# Patient Record
Sex: FEMALE | Race: WHITE | NOT HISPANIC OR LATINO | Employment: FULL TIME | URBAN - METROPOLITAN AREA
[De-identification: names, ages, dates, MRNs, and addresses within clinical notes are randomized per-mention and may not be internally consistent; named-entity substitution may affect disease eponyms.]

---

## 2023-03-27 DIAGNOSIS — I10 HYPERTENSION, UNSPECIFIED TYPE: ICD-10-CM

## 2023-03-27 DIAGNOSIS — E11.42 DIABETIC POLYNEUROPATHY ASSOCIATED WITH TYPE 2 DIABETES MELLITUS (MULTI): Primary | ICD-10-CM

## 2023-03-27 RX ORDER — GABAPENTIN 100 MG/1
1 CAPSULE ORAL 4 TIMES DAILY
COMMUNITY
Start: 2016-08-23 | End: 2023-03-27 | Stop reason: SDUPTHER

## 2023-03-27 RX ORDER — LOSARTAN POTASSIUM 100 MG/1
1 TABLET ORAL DAILY
COMMUNITY
Start: 2019-06-19 | End: 2023-03-27 | Stop reason: SDUPTHER

## 2023-03-27 RX ORDER — GABAPENTIN 100 MG/1
100 CAPSULE ORAL 4 TIMES DAILY
Qty: 90 CAPSULE | Refills: 1 | Status: SHIPPED | OUTPATIENT
Start: 2023-03-27 | End: 2023-03-29 | Stop reason: SDUPTHER

## 2023-03-27 RX ORDER — LOSARTAN POTASSIUM 100 MG/1
100 TABLET ORAL DAILY
Qty: 90 TABLET | Refills: 1 | Status: SHIPPED | OUTPATIENT
Start: 2023-03-27 | End: 2023-03-29 | Stop reason: SDUPTHER

## 2023-03-29 DIAGNOSIS — E11.42 DIABETIC POLYNEUROPATHY ASSOCIATED WITH TYPE 2 DIABETES MELLITUS (MULTI): ICD-10-CM

## 2023-03-29 DIAGNOSIS — I10 HYPERTENSION, UNSPECIFIED TYPE: ICD-10-CM

## 2023-04-02 RX ORDER — LOSARTAN POTASSIUM 100 MG/1
100 TABLET ORAL DAILY
Qty: 90 TABLET | Refills: 1 | Status: SHIPPED | OUTPATIENT
Start: 2023-04-02 | End: 2023-05-03 | Stop reason: SDUPTHER

## 2023-04-02 RX ORDER — GABAPENTIN 100 MG/1
100 CAPSULE ORAL 4 TIMES DAILY
Qty: 90 CAPSULE | Refills: 1 | Status: SHIPPED | OUTPATIENT
Start: 2023-04-02 | End: 2023-05-03 | Stop reason: SDUPTHER

## 2023-04-10 DIAGNOSIS — E11.42 DIABETIC POLYNEUROPATHY ASSOCIATED WITH TYPE 2 DIABETES MELLITUS (MULTI): ICD-10-CM

## 2023-04-10 DIAGNOSIS — I10 HYPERTENSION, UNSPECIFIED TYPE: ICD-10-CM

## 2023-04-10 RX ORDER — OMEPRAZOLE 40 MG/1
1 CAPSULE, DELAYED RELEASE ORAL DAILY
COMMUNITY
Start: 2021-07-30 | End: 2023-05-03 | Stop reason: SDUPTHER

## 2023-04-10 RX ORDER — SEMAGLUTIDE 1.34 MG/ML
INJECTION, SOLUTION SUBCUTANEOUS
COMMUNITY
End: 2023-05-03 | Stop reason: ALTCHOICE

## 2023-04-11 RX ORDER — SEMAGLUTIDE 1.34 MG/ML
INJECTION, SOLUTION SUBCUTANEOUS
Qty: 1 EACH | Refills: 11 | OUTPATIENT
Start: 2023-04-11

## 2023-04-11 RX ORDER — LOSARTAN POTASSIUM 100 MG/1
100 TABLET ORAL DAILY
Qty: 90 TABLET | Refills: 1 | OUTPATIENT
Start: 2023-04-11

## 2023-04-11 RX ORDER — GABAPENTIN 100 MG/1
100 CAPSULE ORAL 4 TIMES DAILY
Qty: 90 CAPSULE | Refills: 1 | OUTPATIENT
Start: 2023-04-11

## 2023-04-26 ENCOUNTER — TELEPHONE (OUTPATIENT)
Dept: PRIMARY CARE | Facility: CLINIC | Age: 48
End: 2023-04-26
Payer: COMMERCIAL

## 2023-04-26 DIAGNOSIS — E11.9 TYPE 2 DIABETES MELLITUS WITHOUT COMPLICATION, WITHOUT LONG-TERM CURRENT USE OF INSULIN (MULTI): Primary | ICD-10-CM

## 2023-04-26 DIAGNOSIS — I50.1 PULMONARY EDEMA CARDIAC CAUSE (MULTI): ICD-10-CM

## 2023-04-26 DIAGNOSIS — D69.6 THROMBOCYTOPENIA (CMS-HCC): ICD-10-CM

## 2023-04-27 ENCOUNTER — LAB (OUTPATIENT)
Dept: LAB | Facility: LAB | Age: 48
End: 2023-04-27
Payer: COMMERCIAL

## 2023-04-27 DIAGNOSIS — D69.6 THROMBOCYTOPENIA (CMS-HCC): ICD-10-CM

## 2023-04-27 DIAGNOSIS — E11.9 TYPE 2 DIABETES MELLITUS WITHOUT COMPLICATION, WITHOUT LONG-TERM CURRENT USE OF INSULIN (MULTI): ICD-10-CM

## 2023-04-27 DIAGNOSIS — I50.1 PULMONARY EDEMA CARDIAC CAUSE (MULTI): ICD-10-CM

## 2023-04-27 LAB
ALANINE AMINOTRANSFERASE (SGPT) (U/L) IN SER/PLAS: 31 U/L (ref 7–45)
ALBUMIN (G/DL) IN SER/PLAS: 4.1 G/DL (ref 3.4–5)
ALKALINE PHOSPHATASE (U/L) IN SER/PLAS: 71 U/L (ref 33–110)
ANION GAP IN SER/PLAS: 16 MMOL/L (ref 10–20)
ASPARTATE AMINOTRANSFERASE (SGOT) (U/L) IN SER/PLAS: 25 U/L (ref 9–39)
BILIRUBIN TOTAL (MG/DL) IN SER/PLAS: 0.3 MG/DL (ref 0–1.2)
CALCIUM (MG/DL) IN SER/PLAS: 9.3 MG/DL (ref 8.6–10.3)
CARBON DIOXIDE, TOTAL (MMOL/L) IN SER/PLAS: 20 MMOL/L (ref 21–32)
CHLORIDE (MMOL/L) IN SER/PLAS: 102 MMOL/L (ref 98–107)
CHOLESTEROL (MG/DL) IN SER/PLAS: 162 MG/DL (ref 0–199)
CHOLESTEROL IN HDL (MG/DL) IN SER/PLAS: 44.5 MG/DL
CHOLESTEROL/HDL RATIO: 3.6
COBALAMIN (VITAMIN B12) (PG/ML) IN SER/PLAS: 442 PG/ML (ref 211–911)
CREATININE (MG/DL) IN SER/PLAS: 0.76 MG/DL (ref 0.5–1.05)
ERYTHROCYTE DISTRIBUTION WIDTH (RATIO) BY AUTOMATED COUNT: 14 % (ref 11.5–14.5)
ERYTHROCYTE MEAN CORPUSCULAR HEMOGLOBIN CONCENTRATION (G/DL) BY AUTOMATED: 31.3 G/DL (ref 32–36)
ERYTHROCYTE MEAN CORPUSCULAR VOLUME (FL) BY AUTOMATED COUNT: 85 FL (ref 80–100)
ERYTHROCYTES (10*6/UL) IN BLOOD BY AUTOMATED COUNT: 4.9 X10E12/L (ref 4–5.2)
ESTIMATED AVERAGE GLUCOSE FOR HBA1C: 131 MG/DL
GFR FEMALE: >90 ML/MIN/1.73M2
GLUCOSE (MG/DL) IN SER/PLAS: 177 MG/DL (ref 74–99)
HEMATOCRIT (%) IN BLOOD BY AUTOMATED COUNT: 41.8 % (ref 36–46)
HEMOGLOBIN (G/DL) IN BLOOD: 13.1 G/DL (ref 12–16)
HEMOGLOBIN A1C/HEMOGLOBIN TOTAL IN BLOOD: 6.2 %
LDL: 60 MG/DL (ref 0–99)
LEUKOCYTES (10*3/UL) IN BLOOD BY AUTOMATED COUNT: 9.2 X10E9/L (ref 4.4–11.3)
NON HDL CHOLESTEROL: 118 MG/DL
PLATELETS (10*3/UL) IN BLOOD AUTOMATED COUNT: 273 X10E9/L (ref 150–450)
POTASSIUM (MMOL/L) IN SER/PLAS: 3.7 MMOL/L (ref 3.5–5.3)
PROTEIN TOTAL: 7.1 G/DL (ref 6.4–8.2)
SODIUM (MMOL/L) IN SER/PLAS: 134 MMOL/L (ref 136–145)
THYROTROPIN (MIU/L) IN SER/PLAS BY DETECTION LIMIT <= 0.05 MIU/L: 1.33 MIU/L (ref 0.44–3.98)
TRIGLYCERIDE (MG/DL) IN SER/PLAS: 287 MG/DL (ref 0–149)
UREA NITROGEN (MG/DL) IN SER/PLAS: 14 MG/DL (ref 6–23)
VLDL: 57 MG/DL (ref 0–40)

## 2023-04-27 PROCEDURE — 84443 ASSAY THYROID STIM HORMONE: CPT

## 2023-04-27 PROCEDURE — 83036 HEMOGLOBIN GLYCOSYLATED A1C: CPT

## 2023-04-27 PROCEDURE — 80053 COMPREHEN METABOLIC PANEL: CPT

## 2023-04-27 PROCEDURE — 82607 VITAMIN B-12: CPT

## 2023-04-27 PROCEDURE — 80061 LIPID PANEL: CPT

## 2023-04-27 PROCEDURE — 36415 COLL VENOUS BLD VENIPUNCTURE: CPT

## 2023-04-27 PROCEDURE — 85027 COMPLETE CBC AUTOMATED: CPT

## 2023-04-28 PROBLEM — R19.7 DIARRHEA: Status: ACTIVE | Noted: 2023-04-28

## 2023-04-28 PROBLEM — E11.40 DIABETIC NEUROPATHY (MULTI): Status: ACTIVE | Noted: 2023-04-28

## 2023-04-28 PROBLEM — J30.9 ALLERGIC RHINITIS: Status: ACTIVE | Noted: 2023-04-28

## 2023-04-28 PROBLEM — R60.9 DEPENDENT EDEMA: Status: ACTIVE | Noted: 2023-04-28

## 2023-04-28 PROBLEM — H90.3 ASYMMETRIC SNHL (SENSORINEURAL HEARING LOSS): Status: ACTIVE | Noted: 2023-04-28

## 2023-04-28 PROBLEM — H69.91 DYSFUNCTION OF RIGHT EUSTACHIAN TUBE: Status: ACTIVE | Noted: 2023-04-28

## 2023-04-28 PROBLEM — L71.9 ACNE ROSACEA: Status: ACTIVE | Noted: 2023-04-28

## 2023-04-28 PROBLEM — H90.2 CONDUCTIVE HEARING LOSS: Status: ACTIVE | Noted: 2023-04-28

## 2023-04-28 PROBLEM — J01.90 ACUTE SINUSITIS: Status: ACTIVE | Noted: 2023-04-28

## 2023-04-28 RX ORDER — VERAPAMIL HYDROCHLORIDE 180 MG/1
1 TABLET, FILM COATED, EXTENDED RELEASE ORAL DAILY
COMMUNITY
Start: 2022-01-26 | End: 2023-05-03 | Stop reason: ALTCHOICE

## 2023-04-28 RX ORDER — FLUTICASONE PROPIONATE 50 MCG
SPRAY, SUSPENSION (ML) NASAL
COMMUNITY
Start: 2020-07-13 | End: 2023-05-03 | Stop reason: SDUPTHER

## 2023-04-28 RX ORDER — PROPRANOLOL HYDROCHLORIDE 80 MG/1
80 TABLET ORAL
COMMUNITY
End: 2023-05-03 | Stop reason: ALTCHOICE

## 2023-04-28 RX ORDER — QUETIAPINE FUMARATE 100 MG/1
150 TABLET, FILM COATED ORAL
COMMUNITY
End: 2023-05-03 | Stop reason: ALTCHOICE

## 2023-04-28 RX ORDER — BUSPIRONE HYDROCHLORIDE 10 MG/1
TABLET ORAL
COMMUNITY
End: 2023-05-03 | Stop reason: ALTCHOICE

## 2023-04-28 RX ORDER — TORSEMIDE 20 MG/1
40 TABLET ORAL DAILY
COMMUNITY
End: 2023-05-03 | Stop reason: ALTCHOICE

## 2023-04-28 RX ORDER — BUSPIRONE HYDROCHLORIDE 30 MG/1
1 TABLET ORAL 2 TIMES DAILY
COMMUNITY
Start: 2023-03-07 | End: 2023-05-03 | Stop reason: ALTCHOICE

## 2023-04-28 RX ORDER — LOSARTAN POTASSIUM AND HYDROCHLOROTHIAZIDE 12.5; 1 MG/1; MG/1
TABLET ORAL
COMMUNITY
End: 2023-05-03 | Stop reason: ALTCHOICE

## 2023-04-28 RX ORDER — LAMOTRIGINE 25 MG/1
2 TABLET ORAL DAILY
COMMUNITY
Start: 2022-09-06 | End: 2023-05-03 | Stop reason: ALTCHOICE

## 2023-04-28 RX ORDER — HYDROXYZINE HYDROCHLORIDE 50 MG/1
50 TABLET, FILM COATED ORAL 3 TIMES DAILY
COMMUNITY

## 2023-04-28 RX ORDER — METHYLPREDNISOLONE 4 MG/1
TABLET ORAL
COMMUNITY
Start: 2023-02-17 | End: 2023-07-25 | Stop reason: SDUPTHER

## 2023-04-28 RX ORDER — LANOLIN ALCOHOL/MO/W.PET/CERES
2 CREAM (GRAM) TOPICAL 2 TIMES DAILY
COMMUNITY
Start: 2022-12-22 | End: 2023-05-03 | Stop reason: SDUPTHER

## 2023-04-28 RX ORDER — CHOLESTYRAMINE LIGHT 4 G/5.7G
POWDER, FOR SUSPENSION ORAL
COMMUNITY
Start: 2023-04-04 | End: 2023-05-03 | Stop reason: ALTCHOICE

## 2023-04-28 RX ORDER — BUSPIRONE HYDROCHLORIDE 15 MG/1
15 TABLET ORAL 2 TIMES DAILY
COMMUNITY
End: 2023-10-09

## 2023-04-28 RX ORDER — METFORMIN HYDROCHLORIDE 500 MG/1
4 TABLET, EXTENDED RELEASE ORAL
COMMUNITY
Start: 2022-01-12 | End: 2023-05-03 | Stop reason: SDUPTHER

## 2023-04-28 RX ORDER — LURASIDONE HYDROCHLORIDE 40 MG/1
1 TABLET, FILM COATED ORAL DAILY
COMMUNITY
Start: 2022-01-03 | End: 2023-10-09

## 2023-04-28 RX ORDER — POTASSIUM CHLORIDE 600 MG/1
80 TABLET, FILM COATED, EXTENDED RELEASE ORAL
COMMUNITY
End: 2023-05-03 | Stop reason: ALTCHOICE

## 2023-04-28 RX ORDER — BUMETANIDE 2 MG/1
1 TABLET ORAL 2 TIMES DAILY
COMMUNITY
Start: 2023-01-26 | End: 2023-05-03 | Stop reason: SDUPTHER

## 2023-04-28 RX ORDER — METOCLOPRAMIDE 10 MG/1
TABLET ORAL
COMMUNITY
Start: 2023-02-22 | End: 2023-05-03 | Stop reason: ALTCHOICE

## 2023-04-28 RX ORDER — VERAPAMIL HYDROCHLORIDE 240 MG/1
1 TABLET, FILM COATED, EXTENDED RELEASE ORAL DAILY
COMMUNITY
Start: 2014-09-11 | End: 2023-05-03 | Stop reason: SDUPTHER

## 2023-04-28 RX ORDER — LORAZEPAM 0.5 MG/1
0.5 TABLET ORAL EVERY 12 HOURS PRN
COMMUNITY
End: 2023-05-03 | Stop reason: ALTCHOICE

## 2023-04-28 RX ORDER — LOSARTAN POTASSIUM 100 MG/1
1 TABLET ORAL DAILY
COMMUNITY
Start: 2022-03-10 | End: 2023-05-03 | Stop reason: ALTCHOICE

## 2023-04-28 RX ORDER — MONTELUKAST SODIUM 10 MG/1
1 TABLET ORAL DAILY
COMMUNITY
Start: 2022-01-15 | End: 2023-05-03 | Stop reason: SDUPTHER

## 2023-04-28 RX ORDER — LURASIDONE HYDROCHLORIDE 40 MG/1
40 TABLET, FILM COATED ORAL DAILY
COMMUNITY
End: 2023-05-03 | Stop reason: ALTCHOICE

## 2023-04-28 RX ORDER — METOLAZONE 2.5 MG/1
TABLET ORAL
COMMUNITY
End: 2023-05-03 | Stop reason: ALTCHOICE

## 2023-04-28 RX ORDER — LAMOTRIGINE 100 MG/1
1 TABLET ORAL DAILY
COMMUNITY
Start: 2023-03-07

## 2023-04-28 RX ORDER — VERAPAMIL HYDROCHLORIDE 360 MG/1
180 CAPSULE, DELAYED RELEASE PELLETS ORAL
COMMUNITY
End: 2023-05-03 | Stop reason: ALTCHOICE

## 2023-04-28 RX ORDER — LORATADINE 10 MG/1
1 TABLET ORAL DAILY
COMMUNITY
Start: 2022-01-16 | End: 2023-05-03 | Stop reason: SDUPTHER

## 2023-04-28 RX ORDER — LACTULOSE 10 G/15ML
SOLUTION ORAL; RECTAL
COMMUNITY
Start: 2023-02-25 | End: 2023-05-03 | Stop reason: SDUPTHER

## 2023-04-28 RX ORDER — AZELASTINE 1 MG/ML
SPRAY, METERED NASAL
COMMUNITY
Start: 2022-04-28 | End: 2023-05-03 | Stop reason: SDUPTHER

## 2023-04-28 RX ORDER — KETOROLAC TROMETHAMINE 10 MG/1
TABLET, FILM COATED ORAL
COMMUNITY
Start: 2023-02-22 | End: 2023-05-03 | Stop reason: ALTCHOICE

## 2023-04-28 RX ORDER — BLOOD-GLUCOSE METER
EACH MISCELLANEOUS
COMMUNITY
Start: 2020-05-05 | End: 2023-05-03 | Stop reason: SDUPTHER

## 2023-04-28 RX ORDER — CHOLESTYRAMINE 4 G/4.8G
POWDER, FOR SUSPENSION ORAL
COMMUNITY
Start: 2022-01-12 | End: 2023-05-03 | Stop reason: ALTCHOICE

## 2023-04-28 RX ORDER — TOPIRAMATE 50 MG/1
TABLET, FILM COATED ORAL
COMMUNITY
End: 2023-05-03 | Stop reason: SDUPTHER

## 2023-04-28 RX ORDER — ALBUTEROL SULFATE 90 UG/1
AEROSOL, METERED RESPIRATORY (INHALATION)
COMMUNITY
Start: 2022-03-10 | End: 2023-05-03 | Stop reason: SDUPTHER

## 2023-04-28 RX ORDER — LAMOTRIGINE 150 MG/1
150 TABLET ORAL
COMMUNITY
End: 2023-05-03 | Stop reason: ALTCHOICE

## 2023-04-28 RX ORDER — POTASSIUM CHLORIDE 20 MEQ/1
40 TABLET, EXTENDED RELEASE ORAL DAILY
COMMUNITY
End: 2023-05-03 | Stop reason: SDUPTHER

## 2023-04-28 RX ORDER — ATORVASTATIN CALCIUM 10 MG/1
1 TABLET, FILM COATED ORAL NIGHTLY
COMMUNITY
Start: 2017-10-27 | End: 2023-05-03 | Stop reason: SDUPTHER

## 2023-04-28 RX ORDER — TOPIRAMATE 25 MG/1
TABLET ORAL
COMMUNITY
Start: 2023-04-19 | End: 2023-05-03 | Stop reason: ALTCHOICE

## 2023-05-02 RX ORDER — SEMAGLUTIDE 1.34 MG/ML
INJECTION, SOLUTION SUBCUTANEOUS
Qty: 1 EACH | Refills: 3 | Status: CANCELLED | OUTPATIENT
Start: 2023-05-02

## 2023-05-02 RX ORDER — OMEPRAZOLE 40 MG/1
40 CAPSULE, DELAYED RELEASE ORAL DAILY
Qty: 90 CAPSULE | Refills: 1 | Status: CANCELLED | OUTPATIENT
Start: 2023-05-02

## 2023-05-03 ENCOUNTER — TELEMEDICINE (OUTPATIENT)
Dept: PRIMARY CARE | Facility: CLINIC | Age: 48
End: 2023-05-03
Payer: COMMERCIAL

## 2023-05-03 ENCOUNTER — APPOINTMENT (OUTPATIENT)
Dept: PRIMARY CARE | Facility: CLINIC | Age: 48
End: 2023-05-03
Payer: COMMERCIAL

## 2023-05-03 VITALS — BODY MASS INDEX: 53.66 KG/M2 | TEMPERATURE: 100.7 F | WEIGHT: 284 LBS

## 2023-05-03 DIAGNOSIS — E11.9 TYPE 2 DIABETES MELLITUS WITHOUT COMPLICATION, WITHOUT LONG-TERM CURRENT USE OF INSULIN (MULTI): ICD-10-CM

## 2023-05-03 DIAGNOSIS — I51.89 DIASTOLIC DYSFUNCTION: ICD-10-CM

## 2023-05-03 DIAGNOSIS — K59.00 CONSTIPATION, UNSPECIFIED CONSTIPATION TYPE: ICD-10-CM

## 2023-05-03 DIAGNOSIS — G47.33 OBSTRUCTIVE SLEEP APNEA: ICD-10-CM

## 2023-05-03 DIAGNOSIS — G43.909 MIGRAINE WITHOUT STATUS MIGRAINOSUS, NOT INTRACTABLE, UNSPECIFIED MIGRAINE TYPE: ICD-10-CM

## 2023-05-03 DIAGNOSIS — I67.1 INTRACRANIAL ANEURYSM (HHS-HCC): ICD-10-CM

## 2023-05-03 DIAGNOSIS — E11.42 DIABETIC POLYNEUROPATHY ASSOCIATED WITH TYPE 2 DIABETES MELLITUS (MULTI): ICD-10-CM

## 2023-05-03 DIAGNOSIS — D69.3 IDIOPATHIC THROMBOCYTOPENIC PURPURA (MULTI): ICD-10-CM

## 2023-05-03 DIAGNOSIS — I10 HYPERTENSION, UNSPECIFIED TYPE: ICD-10-CM

## 2023-05-03 DIAGNOSIS — J45.909 ASTHMA, UNSPECIFIED ASTHMA SEVERITY, UNSPECIFIED WHETHER COMPLICATED, UNSPECIFIED WHETHER PERSISTENT (HHS-HCC): ICD-10-CM

## 2023-05-03 DIAGNOSIS — K21.9 GASTROESOPHAGEAL REFLUX DISEASE, UNSPECIFIED WHETHER ESOPHAGITIS PRESENT: ICD-10-CM

## 2023-05-03 DIAGNOSIS — E78.5 HYPERLIPIDEMIA, UNSPECIFIED HYPERLIPIDEMIA TYPE: Primary | ICD-10-CM

## 2023-05-03 DIAGNOSIS — J30.89 ALLERGIC RHINITIS DUE TO OTHER ALLERGIC TRIGGER, UNSPECIFIED SEASONALITY: ICD-10-CM

## 2023-05-03 PROBLEM — R60.9 DEPENDENT EDEMA: Status: RESOLVED | Noted: 2023-04-28 | Resolved: 2023-05-03

## 2023-05-03 PROBLEM — J01.90 ACUTE SINUSITIS: Status: RESOLVED | Noted: 2023-04-28 | Resolved: 2023-05-03

## 2023-05-03 PROBLEM — H69.91 DYSFUNCTION OF RIGHT EUSTACHIAN TUBE: Status: RESOLVED | Noted: 2023-04-28 | Resolved: 2023-05-03

## 2023-05-03 PROBLEM — R19.7 DIARRHEA: Status: RESOLVED | Noted: 2023-04-28 | Resolved: 2023-05-03

## 2023-05-03 PROCEDURE — 99214 OFFICE O/P EST MOD 30 MIN: CPT | Performed by: PEDIATRICS

## 2023-05-03 RX ORDER — GABAPENTIN 100 MG/1
100 CAPSULE ORAL 4 TIMES DAILY
Qty: 120 CAPSULE | Refills: 5 | Status: SHIPPED | OUTPATIENT
Start: 2023-05-03 | End: 2023-11-15

## 2023-05-03 RX ORDER — DEXTROSE 4 G
TABLET,CHEWABLE ORAL
Qty: 1 EACH | Refills: 0 | Status: SHIPPED | OUTPATIENT
Start: 2023-05-03 | End: 2023-05-13

## 2023-05-03 RX ORDER — BLOOD-GLUCOSE METER
EACH MISCELLANEOUS
Qty: 100 STRIP | Refills: 1 | Status: SHIPPED | OUTPATIENT
Start: 2023-05-03 | End: 2024-01-18 | Stop reason: WASHOUT

## 2023-05-03 RX ORDER — OMEPRAZOLE 40 MG/1
40 CAPSULE, DELAYED RELEASE ORAL DAILY
Qty: 90 CAPSULE | Refills: 1 | Status: SHIPPED | OUTPATIENT
Start: 2023-05-03 | End: 2023-10-05

## 2023-05-03 RX ORDER — FLUTICASONE PROPIONATE 50 MCG
2 SPRAY, SUSPENSION (ML) NASAL DAILY
Qty: 16 G | Refills: 5 | Status: SHIPPED | OUTPATIENT
Start: 2023-05-03 | End: 2024-03-04

## 2023-05-03 RX ORDER — METFORMIN HYDROCHLORIDE 500 MG/1
2000 TABLET, EXTENDED RELEASE ORAL
Qty: 360 TABLET | Refills: 1 | Status: SHIPPED | OUTPATIENT
Start: 2023-05-03 | End: 2024-03-06

## 2023-05-03 RX ORDER — BUMETANIDE 2 MG/1
2 TABLET ORAL 2 TIMES DAILY
Qty: 180 TABLET | Refills: 1 | Status: SHIPPED | OUTPATIENT
Start: 2023-05-03 | End: 2023-09-12

## 2023-05-03 RX ORDER — VERAPAMIL HYDROCHLORIDE 240 MG/1
240 TABLET, FILM COATED, EXTENDED RELEASE ORAL DAILY
Qty: 90 TABLET | Refills: 1 | Status: SHIPPED | OUTPATIENT
Start: 2023-05-03 | End: 2023-11-17

## 2023-05-03 RX ORDER — LANOLIN ALCOHOL/MO/W.PET/CERES
2 CREAM (GRAM) TOPICAL 2 TIMES DAILY
Qty: 120 TABLET | Refills: 5 | Status: SHIPPED | OUTPATIENT
Start: 2023-05-03 | End: 2023-10-05

## 2023-05-03 RX ORDER — LOSARTAN POTASSIUM 100 MG/1
100 TABLET ORAL DAILY
Qty: 90 TABLET | Refills: 1 | Status: SHIPPED | OUTPATIENT
Start: 2023-05-03 | End: 2024-01-18 | Stop reason: SDUPTHER

## 2023-05-03 RX ORDER — LORATADINE 10 MG/1
10 TABLET ORAL DAILY
Qty: 90 TABLET | Refills: 3 | Status: SHIPPED | OUTPATIENT
Start: 2023-05-03 | End: 2024-04-02

## 2023-05-03 RX ORDER — MONTELUKAST SODIUM 10 MG/1
10 TABLET ORAL DAILY
Qty: 90 TABLET | Refills: 1 | Status: SHIPPED | OUTPATIENT
Start: 2023-05-03 | End: 2023-11-17

## 2023-05-03 RX ORDER — ATORVASTATIN CALCIUM 10 MG/1
10 TABLET, FILM COATED ORAL NIGHTLY
Qty: 90 TABLET | Refills: 1 | Status: SHIPPED | OUTPATIENT
Start: 2023-05-03 | End: 2023-11-15

## 2023-05-03 RX ORDER — ALBUTEROL SULFATE 90 UG/1
AEROSOL, METERED RESPIRATORY (INHALATION)
Qty: 18 G | Refills: 3 | Status: SHIPPED | OUTPATIENT
Start: 2023-05-03 | End: 2024-01-04

## 2023-05-03 RX ORDER — TOPIRAMATE 50 MG/1
TABLET, FILM COATED ORAL
Qty: 90 TABLET | Refills: 1 | Status: SHIPPED | OUTPATIENT
Start: 2023-05-03 | End: 2023-08-19

## 2023-05-03 RX ORDER — POTASSIUM CHLORIDE 20 MEQ/1
40 TABLET, EXTENDED RELEASE ORAL DAILY
Qty: 180 TABLET | Refills: 1 | Status: SHIPPED | OUTPATIENT
Start: 2023-05-03 | End: 2024-01-04

## 2023-05-03 RX ORDER — LACTULOSE 10 G/15ML
SOLUTION ORAL; RECTAL
Qty: 2000 ML | Refills: 11 | Status: SHIPPED | OUTPATIENT
Start: 2023-05-03

## 2023-05-03 RX ORDER — AZELASTINE 1 MG/ML
2 SPRAY, METERED NASAL 2 TIMES DAILY
Qty: 30 ML | Refills: 5 | Status: SHIPPED | OUTPATIENT
Start: 2023-05-03 | End: 2023-10-05

## 2023-05-03 NOTE — ASSESSMENT & PLAN NOTE
LDL 60; on Atorva 10; TG high but does eat egg yolks an does eat butter; has lazaro and sausage at times; advised against these. Also eats cheese

## 2023-05-03 NOTE — PROGRESS NOTES
Subjective   Patient ID: Leslee Rushing is a 48 y.o. female who presents for fever     HPI   Patient developed fever of 100.7 for 3 days as well as cough; Covid test was negative Saturday; she had aches in hips and legs initially but not now; she has sneezing, runny nose, eyes are red and itchy; has not bringing up phlegm. No pain over sinuses; Has been using Nyquil;  Quit smoking last month  Review of Systems    Objective   Temp 38.2 °C (100.7 °F)   Wt 129 kg (284 lb)   BMI 53.66 kg/m²     Physical Exam  Patient seen on video, coughing intermittently but in no distress.  Assessment/Plan   Problem List Items Addressed This Visit          Nervous    Diabetic neuropathy (CMS/[facility])     Neuropathy controlled on Gabapentin         Obstructive sleep apnea     Uses CPAP nightly            Circulatory    Hypertension     Does not check at home;          Intracranial aneurysm     Followed by Dr Ch            Endocrine/Metabolic    Diabetes mellitus (CMS/[facility])     Lost meter but A1C was excellent            Other    Allergic rhinitis     Controlled with claritin and flonase and azelastine         Hyperlipidemia - Primary     LDL 60; on Atorva 10; TG high but does eat egg yolks an does eat butter; has lazaro and sausage at times; advised against these. Also eats cheese         Idiopathic thrombocytopenic purpura (CMS/[facility])     Last platelets normal         Migraine headache     Topamax controls HA          Other Visit Diagnoses       Asthma, unspecified asthma severity, unspecified whether complicated, unspecified whether persistent        Diastolic dysfunction        Constipation, unspecified constipation type        Gastroesophageal reflux disease, unspecified whether esophagitis present

## 2023-05-04 DIAGNOSIS — E11.9 TYPE 2 DIABETES MELLITUS WITHOUT COMPLICATION, WITHOUT LONG-TERM CURRENT USE OF INSULIN (MULTI): ICD-10-CM

## 2023-05-09 RX ORDER — CHOLESTYRAMINE LIGHT 4 G/5.7G
POWDER, FOR SUSPENSION ORAL
Qty: 231 G | Refills: 10 | OUTPATIENT
Start: 2023-05-09

## 2023-05-09 RX ORDER — DEXTROSE 4 G
TABLET,CHEWABLE ORAL
Refills: 10 | OUTPATIENT
Start: 2023-05-09

## 2023-05-10 DIAGNOSIS — E11.9 TYPE 2 DIABETES MELLITUS WITHOUT COMPLICATION, WITHOUT LONG-TERM CURRENT USE OF INSULIN (MULTI): ICD-10-CM

## 2023-05-13 RX ORDER — CHOLESTYRAMINE LIGHT 4 G/5.7G
POWDER, FOR SUSPENSION ORAL
Qty: 231 G | Refills: 10 | OUTPATIENT
Start: 2023-05-13

## 2023-05-13 RX ORDER — DEXTROSE 4 G
TABLET,CHEWABLE ORAL
Qty: 1 EACH | Refills: 10 | Status: SHIPPED | OUTPATIENT
Start: 2023-05-13

## 2023-05-18 RX ORDER — CHOLESTYRAMINE LIGHT 4 G/5.7G
POWDER, FOR SUSPENSION ORAL
Qty: 231 G | Refills: 10 | OUTPATIENT
Start: 2023-05-18

## 2023-05-24 RX ORDER — CHOLESTYRAMINE LIGHT 4 G/5.7G
POWDER, FOR SUSPENSION ORAL
Qty: 231 G | Refills: 10 | OUTPATIENT
Start: 2023-05-24

## 2023-05-26 RX ORDER — CHOLESTYRAMINE LIGHT 4 G/5.7G
POWDER, FOR SUSPENSION ORAL
COMMUNITY
Start: 2023-05-04 | End: 2023-05-27 | Stop reason: ALTCHOICE

## 2023-05-27 DIAGNOSIS — J30.89 ALLERGIC RHINITIS DUE TO OTHER ALLERGIC TRIGGER, UNSPECIFIED SEASONALITY: ICD-10-CM

## 2023-05-27 RX ORDER — CHOLESTYRAMINE LIGHT 4 G/5.7G
POWDER, FOR SUSPENSION ORAL
Qty: 239.4 G | Refills: 1 | OUTPATIENT
Start: 2023-05-27

## 2023-06-02 RX ORDER — CLOBETASOL PROPIONATE 0.5 MG/G
OINTMENT TOPICAL
COMMUNITY
Start: 2023-05-18 | End: 2024-02-20 | Stop reason: WASHOUT

## 2023-06-02 RX ORDER — TORSEMIDE 20 MG/1
20 TABLET ORAL
COMMUNITY
End: 2024-01-18 | Stop reason: ALTCHOICE

## 2023-06-09 RX ORDER — CHOLESTYRAMINE LIGHT 4 G/5.7G
POWDER, FOR SUSPENSION ORAL
Qty: 231 G | Refills: 10 | OUTPATIENT
Start: 2023-06-09

## 2023-07-25 ENCOUNTER — OFFICE VISIT (OUTPATIENT)
Dept: PRIMARY CARE | Facility: CLINIC | Age: 48
End: 2023-07-25
Payer: COMMERCIAL

## 2023-07-25 VITALS
DIASTOLIC BLOOD PRESSURE: 66 MMHG | HEART RATE: 70 BPM | SYSTOLIC BLOOD PRESSURE: 115 MMHG | BODY MASS INDEX: 55.32 KG/M2 | WEIGHT: 293 LBS | TEMPERATURE: 98.7 F | HEIGHT: 61 IN | OXYGEN SATURATION: 98 %

## 2023-07-25 DIAGNOSIS — H10.13 ALLERGIC CONJUNCTIVITIS OF BOTH EYES: ICD-10-CM

## 2023-07-25 DIAGNOSIS — E11.9 TYPE 2 DIABETES MELLITUS WITHOUT COMPLICATION, WITHOUT LONG-TERM CURRENT USE OF INSULIN (MULTI): ICD-10-CM

## 2023-07-25 DIAGNOSIS — J30.89 ALLERGIC RHINITIS DUE TO OTHER ALLERGIC TRIGGER, UNSPECIFIED SEASONALITY: ICD-10-CM

## 2023-07-25 DIAGNOSIS — J45.20 MILD INTERMITTENT ASTHMA WITHOUT COMPLICATION (HHS-HCC): Primary | ICD-10-CM

## 2023-07-25 PROCEDURE — 1036F TOBACCO NON-USER: CPT | Performed by: PEDIATRICS

## 2023-07-25 PROCEDURE — 99213 OFFICE O/P EST LOW 20 MIN: CPT | Performed by: PEDIATRICS

## 2023-07-25 PROCEDURE — 3066F NEPHROPATHY DOC TX: CPT | Performed by: PEDIATRICS

## 2023-07-25 PROCEDURE — 4010F ACE/ARB THERAPY RXD/TAKEN: CPT | Performed by: PEDIATRICS

## 2023-07-25 PROCEDURE — 3044F HG A1C LEVEL LT 7.0%: CPT | Performed by: PEDIATRICS

## 2023-07-25 PROCEDURE — 3078F DIAST BP <80 MM HG: CPT | Performed by: PEDIATRICS

## 2023-07-25 PROCEDURE — 3074F SYST BP LT 130 MM HG: CPT | Performed by: PEDIATRICS

## 2023-07-25 RX ORDER — OLOPATADINE HYDROCHLORIDE 2 MG/ML
1 SOLUTION/ DROPS OPHTHALMIC DAILY
Qty: 15 ML | Refills: 2 | Status: SHIPPED | OUTPATIENT
Start: 2023-07-25 | End: 2023-07-25 | Stop reason: SDUPTHER

## 2023-07-25 RX ORDER — METHYLPREDNISOLONE 4 MG/1
TABLET ORAL
Qty: 21 TABLET | Refills: 0 | Status: SHIPPED | OUTPATIENT
Start: 2023-07-25 | End: 2023-07-31 | Stop reason: ALTCHOICE

## 2023-07-25 RX ORDER — ALBUTEROL SULFATE 0.83 MG/ML
2.5 SOLUTION RESPIRATORY (INHALATION) ONCE
Status: DISCONTINUED | OUTPATIENT
Start: 2023-07-25 | End: 2023-07-25

## 2023-07-25 RX ORDER — METHYLPREDNISOLONE 4 MG/1
TABLET ORAL
Qty: 21 TABLET | Refills: 0 | Status: SHIPPED | OUTPATIENT
Start: 2023-07-25 | End: 2023-07-25 | Stop reason: SDUPTHER

## 2023-07-25 RX ORDER — FLUTICASONE FUROATE, UMECLIDINIUM BROMIDE AND VILANTEROL TRIFENATATE 200; 62.5; 25 UG/1; UG/1; UG/1
1 POWDER RESPIRATORY (INHALATION) DAILY
Qty: 1 EACH | Refills: 0 | COMMUNITY
Start: 2023-07-25 | End: 2024-02-20 | Stop reason: SDUPTHER

## 2023-07-25 NOTE — PROGRESS NOTES
"Subjective   Patient ID: Leslee Rushing is a 48 y.o. female who presents for cough and allergy symptoms.    HPI   Patient states allergy symptoms worsened a few weeks ago; Taking Loratadine, Flonase, Azelastine and Montelukast;   Asthma is acting up as well; Coughing for 3 days as well as wheezing; using Albuterol for 3 days; has yellow and green mucus from the nose; Home Covid test negative. Eyes tearing and itchy  Sugars are running 118  Review of Systems    Objective   /66   Pulse 70   Temp 37.1 °C (98.7 °F)   Ht 1.549 m (5' 1\")   Wt 136 kg (300 lb)   SpO2 98%   BMI 56.68 kg/m²     Physical Exam  HEENT -->nares erythematous;   Lungs wheezes left base  Heart reg    Assessment/Plan   Problem List Items Addressed This Visit       Allergic rhinitis    Diabetes mellitus (CMS/HCC)    Mild intermittent asthma without complication - Primary     Asthma exacerbated due to cold and allergies         Relevant Medications    albuterol 2.5 mg /3 mL (0.083 %) nebulizer solution 2.5 mg (Start on 7/25/2023 12:30 PM)    methylPREDNISolone (Medrol Dospak) 4 mg tablets    Other Relevant Orders    Aerosol Treatment (21455)    Allergic conjunctivitis of both eyes    Relevant Medications    olopatadine (Pataday Once Daily Relief) 0.2 % ophthalmic solution          "
Pt states he just doesn't want it.

## 2023-07-31 RX ORDER — METHYLPREDNISOLONE 4 MG/1
TABLET ORAL
Qty: 21 TABLET | Refills: 0 | Status: SHIPPED | OUTPATIENT
Start: 2023-07-31 | End: 2023-08-01

## 2023-07-31 RX ORDER — OLOPATADINE HYDROCHLORIDE 2 MG/ML
1 SOLUTION/ DROPS OPHTHALMIC DAILY
Qty: 15 ML | Refills: 2 | Status: SHIPPED | OUTPATIENT
Start: 2023-07-31 | End: 2024-01-18 | Stop reason: WASHOUT

## 2023-08-17 DIAGNOSIS — G43.909 MIGRAINE WITHOUT STATUS MIGRAINOSUS, NOT INTRACTABLE, UNSPECIFIED MIGRAINE TYPE: ICD-10-CM

## 2023-08-19 RX ORDER — TOPIRAMATE 50 MG/1
TABLET, FILM COATED ORAL
Qty: 60 TABLET | Refills: 2 | Status: SHIPPED | OUTPATIENT
Start: 2023-08-19 | End: 2024-03-05

## 2023-09-07 PROBLEM — I67.1: Status: ACTIVE | Noted: 2023-09-07

## 2023-09-07 PROBLEM — E83.42 HYPOMAGNESEMIA: Status: ACTIVE | Noted: 2023-09-07

## 2023-09-07 PROBLEM — R94.31 PROLONGED QT INTERVAL: Status: ACTIVE | Noted: 2023-09-07

## 2023-09-07 PROBLEM — N93.8 DYSFUNCTIONAL UTERINE BLEEDING: Status: ACTIVE | Noted: 2023-09-07

## 2023-09-07 PROBLEM — R42 DIZZINESS AND GIDDINESS: Status: ACTIVE | Noted: 2023-09-07

## 2023-09-07 PROBLEM — F17.290 NICOTINE DEPENDENCE DUE TO VAPING TOBACCO PRODUCT: Status: ACTIVE | Noted: 2023-09-07

## 2023-09-07 PROBLEM — E87.6 HYPOKALEMIA: Status: ACTIVE | Noted: 2023-09-07

## 2023-09-07 PROBLEM — R06.02 SHORTNESS OF BREATH: Status: ACTIVE | Noted: 2023-09-07

## 2023-09-07 PROBLEM — D22.5 MELANOCYTIC NEVI OF TRUNK: Status: ACTIVE | Noted: 2018-10-16

## 2023-09-07 PROBLEM — N90.4 LICHEN SCLEROSUS OF FEMALE GENITALIA: Status: ACTIVE | Noted: 2023-09-07

## 2023-09-07 PROBLEM — J90 PLEURAL EFFUSION: Status: ACTIVE | Noted: 2023-09-07

## 2023-09-07 PROBLEM — L90.5 SCAR CONDITION AND FIBROSIS OF SKIN: Status: ACTIVE | Noted: 2018-10-16

## 2023-09-07 PROBLEM — H69.93 DYSFUNCTION OF BOTH EUSTACHIAN TUBES: Status: ACTIVE | Noted: 2023-09-07

## 2023-09-07 PROBLEM — R01.1 SYSTOLIC EJECTION MURMUR: Status: ACTIVE | Noted: 2023-09-07

## 2023-09-07 PROBLEM — R21 RASH: Status: ACTIVE | Noted: 2023-09-07

## 2023-09-07 PROBLEM — L91.8 OTHER HYPERTROPHIC DISORDERS OF THE SKIN: Status: ACTIVE | Noted: 2018-10-16

## 2023-09-07 PROBLEM — D22.39 MELANOCYTIC NEVI OF OTHER PARTS OF FACE: Status: ACTIVE | Noted: 2018-10-16

## 2023-09-07 PROBLEM — R06.00 DYSPNEA: Status: ACTIVE | Noted: 2023-09-07

## 2023-09-07 PROBLEM — L90.0 LICHEN SCLEROSUS: Status: ACTIVE | Noted: 2023-09-07

## 2023-09-07 PROBLEM — R35.0 URINARY FREQUENCY: Status: ACTIVE | Noted: 2023-09-07

## 2023-09-07 PROBLEM — R74.01 ELEVATED ALT MEASUREMENT: Status: ACTIVE | Noted: 2023-09-07

## 2023-09-07 PROBLEM — I87.2 CHRONIC VENOUS INSUFFICIENCY: Status: ACTIVE | Noted: 2018-10-16

## 2023-09-07 PROBLEM — L82.1 OTHER SEBORRHEIC KERATOSIS: Status: ACTIVE | Noted: 2018-10-16

## 2023-09-07 PROBLEM — N92.1 MENOMETRORRHAGIA: Status: ACTIVE | Noted: 2023-09-07

## 2023-09-07 PROBLEM — D18.01 HEMANGIOMA OF SKIN AND SUBCUTANEOUS TISSUE: Status: ACTIVE | Noted: 2018-10-16

## 2023-09-07 PROBLEM — B37.9 MONILIA INFECTION: Status: ACTIVE | Noted: 2023-09-07

## 2023-09-07 PROBLEM — N92.6 IRREGULAR MENSTRUAL CYCLE: Status: ACTIVE | Noted: 2023-09-07

## 2023-09-07 PROBLEM — H93.293 OTHER ABNORMAL AUDITORY PERCEPTIONS, BILATERAL: Status: ACTIVE | Noted: 2023-09-07

## 2023-09-07 PROBLEM — J81.1 PULMONARY EDEMA (HHS-HCC): Status: ACTIVE | Noted: 2023-09-07

## 2023-09-07 RX ORDER — NORTRIPTYLINE HYDROCHLORIDE 50 MG/1
50 CAPSULE ORAL NIGHTLY
COMMUNITY
End: 2024-01-18 | Stop reason: WASHOUT

## 2023-09-07 RX ORDER — METOCLOPRAMIDE 10 MG/1
10 TABLET ORAL AS NEEDED
COMMUNITY
Start: 2023-02-22 | End: 2024-01-18 | Stop reason: WASHOUT

## 2023-09-07 RX ORDER — CHOLESTYRAMINE 4 G/4.8G
4 POWDER, FOR SUSPENSION ORAL
COMMUNITY
Start: 2022-01-12 | End: 2024-01-18 | Stop reason: ALTCHOICE

## 2023-09-10 DIAGNOSIS — I51.89 DIASTOLIC DYSFUNCTION: ICD-10-CM

## 2023-09-12 RX ORDER — BUMETANIDE 2 MG/1
2 TABLET ORAL 2 TIMES DAILY
Qty: 60 TABLET | Refills: 2 | Status: SHIPPED | OUTPATIENT
Start: 2023-09-12 | End: 2024-01-04

## 2023-09-29 ENCOUNTER — OFFICE VISIT (OUTPATIENT)
Dept: PRIMARY CARE | Facility: CLINIC | Age: 48
End: 2023-09-29
Payer: COMMERCIAL

## 2023-09-29 VITALS
SYSTOLIC BLOOD PRESSURE: 104 MMHG | WEIGHT: 288.8 LBS | OXYGEN SATURATION: 95 % | TEMPERATURE: 96.9 F | HEIGHT: 61 IN | BODY MASS INDEX: 54.53 KG/M2 | HEART RATE: 75 BPM | DIASTOLIC BLOOD PRESSURE: 65 MMHG

## 2023-09-29 DIAGNOSIS — J01.00 ACUTE NON-RECURRENT MAXILLARY SINUSITIS: Primary | ICD-10-CM

## 2023-09-29 DIAGNOSIS — E11.42 TYPE 2 DIABETES MELLITUS WITH DIABETIC POLYNEUROPATHY, WITHOUT LONG-TERM CURRENT USE OF INSULIN (MULTI): Primary | ICD-10-CM

## 2023-09-29 DIAGNOSIS — J01.00 ACUTE NON-RECURRENT MAXILLARY SINUSITIS: ICD-10-CM

## 2023-09-29 DIAGNOSIS — K04.7 DENTAL INFECTION: ICD-10-CM

## 2023-09-29 DIAGNOSIS — E83.42 HYPOMAGNESEMIA: ICD-10-CM

## 2023-09-29 PROCEDURE — 4010F ACE/ARB THERAPY RXD/TAKEN: CPT | Performed by: PEDIATRICS

## 2023-09-29 PROCEDURE — 99213 OFFICE O/P EST LOW 20 MIN: CPT | Performed by: PEDIATRICS

## 2023-09-29 PROCEDURE — 3078F DIAST BP <80 MM HG: CPT | Performed by: PEDIATRICS

## 2023-09-29 PROCEDURE — 1036F TOBACCO NON-USER: CPT | Performed by: PEDIATRICS

## 2023-09-29 PROCEDURE — 3044F HG A1C LEVEL LT 7.0%: CPT | Performed by: PEDIATRICS

## 2023-09-29 PROCEDURE — 3066F NEPHROPATHY DOC TX: CPT | Performed by: PEDIATRICS

## 2023-09-29 PROCEDURE — 3074F SYST BP LT 130 MM HG: CPT | Performed by: PEDIATRICS

## 2023-09-29 RX ORDER — DOXYCYCLINE 100 MG/1
100 CAPSULE ORAL 2 TIMES DAILY
Qty: 20 CAPSULE | Refills: 0 | Status: SHIPPED | OUTPATIENT
Start: 2023-09-29 | End: 2023-09-29 | Stop reason: SDUPTHER

## 2023-09-29 RX ORDER — DOXYCYCLINE 100 MG/1
100 CAPSULE ORAL 2 TIMES DAILY
Qty: 20 CAPSULE | Refills: 0 | Status: SHIPPED | OUTPATIENT
Start: 2023-09-29 | End: 2023-09-30 | Stop reason: SDUPTHER

## 2023-09-29 RX ORDER — DOXYCYCLINE 100 MG/1
100 CAPSULE ORAL 2 TIMES DAILY
Qty: 20 CAPSULE | Refills: 0 | OUTPATIENT
Start: 2023-09-29

## 2023-09-29 ASSESSMENT — PAIN SCALES - GENERAL: PAINLEVEL: 4

## 2023-09-29 NOTE — PROGRESS NOTES
"Subjective   Patient ID: Leslee Rushing is a 48 y.o. female who presents for left facial pain and painful gum    HPI   Left temple to mandible is sore; left upper gum is sore and swollen; last saw dentist 6 months ago; no fevers at home; clear rhinorrhea which is chronic  Review of Systems    Objective   /65 (BP Location: Left arm, Patient Position: Sitting, BP Cuff Size: Adult)   Pulse 75   Temp 36.1 °C (96.9 °F) (Temporal)   Ht 1.549 m (5' 1\")   Wt 131 kg (288 lb 12.8 oz)   LMP 09/08/2023   SpO2 95%   BMI 54.57 kg/m²     Physical Exam  Tender left maxilla; slightly tender left gum  Assessment/Plan   Problem List Items Addressed This Visit             ICD-10-CM    Acute maxillary sinusitis - Primary J01.00    Dental infection K04.7          "

## 2023-09-30 RX ORDER — DOXYCYCLINE 100 MG/1
100 CAPSULE ORAL 2 TIMES DAILY
Qty: 20 CAPSULE | Refills: 0 | Status: SHIPPED | OUTPATIENT
Start: 2023-09-30 | End: 2023-10-10

## 2023-10-03 ENCOUNTER — TELEPHONE (OUTPATIENT)
Dept: PRIMARY CARE | Facility: CLINIC | Age: 48
End: 2023-10-03
Payer: COMMERCIAL

## 2023-10-03 DIAGNOSIS — I51.89 DIASTOLIC DYSFUNCTION: ICD-10-CM

## 2023-10-03 DIAGNOSIS — J30.89 ALLERGIC RHINITIS DUE TO OTHER ALLERGIC TRIGGER, UNSPECIFIED SEASONALITY: ICD-10-CM

## 2023-10-03 DIAGNOSIS — K21.9 GASTROESOPHAGEAL REFLUX DISEASE, UNSPECIFIED WHETHER ESOPHAGITIS PRESENT: ICD-10-CM

## 2023-10-03 NOTE — TELEPHONE ENCOUNTER
Dr. Sabillon [facility] Pharmacist left a voicemail message asking for you to call them regarding a RX that was received on 9/29  Doxy Hcl they need clarification call Pharmacist back at [phone].

## 2023-10-05 RX ORDER — LANOLIN ALCOHOL/MO/W.PET/CERES
2 CREAM (GRAM) TOPICAL 2 TIMES DAILY
Qty: 120 TABLET | Refills: 10 | Status: SHIPPED | OUTPATIENT
Start: 2023-10-05

## 2023-10-05 RX ORDER — OMEPRAZOLE 40 MG/1
40 CAPSULE, DELAYED RELEASE ORAL DAILY
Qty: 30 CAPSULE | Refills: 10 | Status: SHIPPED | OUTPATIENT
Start: 2023-10-05

## 2023-10-05 RX ORDER — AZELASTINE 1 MG/ML
2 SPRAY, METERED NASAL 2 TIMES DAILY
Qty: 30 ML | Refills: 10 | Status: SHIPPED | OUTPATIENT
Start: 2023-10-05

## 2023-11-07 DIAGNOSIS — E11.9 TYPE 2 DIABETES MELLITUS WITHOUT COMPLICATION, WITHOUT LONG-TERM CURRENT USE OF INSULIN (MULTI): ICD-10-CM

## 2023-11-08 RX ORDER — SEMAGLUTIDE 0.68 MG/ML
INJECTION, SOLUTION SUBCUTANEOUS
Qty: 3 ML | Refills: 0 | Status: SHIPPED | OUTPATIENT
Start: 2023-11-08 | End: 2023-12-06

## 2023-11-14 DIAGNOSIS — E78.5 HYPERLIPIDEMIA, UNSPECIFIED HYPERLIPIDEMIA TYPE: ICD-10-CM

## 2023-11-14 DIAGNOSIS — E11.42 DIABETIC POLYNEUROPATHY ASSOCIATED WITH TYPE 2 DIABETES MELLITUS (MULTI): ICD-10-CM

## 2023-11-15 RX ORDER — ATORVASTATIN CALCIUM 10 MG/1
10 TABLET, FILM COATED ORAL NIGHTLY
Qty: 30 TABLET | Refills: 10 | Status: SHIPPED | OUTPATIENT
Start: 2023-11-15 | End: 2023-12-06

## 2023-11-15 RX ORDER — GABAPENTIN 100 MG/1
100 CAPSULE ORAL 4 TIMES DAILY
Qty: 60 CAPSULE | Refills: 0 | Status: SHIPPED | OUTPATIENT
Start: 2023-11-15 | End: 2023-12-07

## 2023-11-17 DIAGNOSIS — I51.89 DIASTOLIC DYSFUNCTION: ICD-10-CM

## 2023-11-17 DIAGNOSIS — I10 HYPERTENSION, UNSPECIFIED TYPE: ICD-10-CM

## 2023-11-17 RX ORDER — MONTELUKAST SODIUM 10 MG/1
10 TABLET ORAL DAILY
Qty: 30 TABLET | Refills: 0 | Status: SHIPPED | OUTPATIENT
Start: 2023-11-17 | End: 2023-12-06

## 2023-11-17 RX ORDER — VERAPAMIL HYDROCHLORIDE 240 MG/1
240 TABLET, FILM COATED, EXTENDED RELEASE ORAL DAILY
Qty: 30 TABLET | Refills: 0 | Status: SHIPPED | OUTPATIENT
Start: 2023-11-17 | End: 2023-12-06

## 2023-12-04 DIAGNOSIS — E11.9 TYPE 2 DIABETES MELLITUS WITHOUT COMPLICATION, WITHOUT LONG-TERM CURRENT USE OF INSULIN (MULTI): ICD-10-CM

## 2023-12-04 DIAGNOSIS — I51.89 DIASTOLIC DYSFUNCTION: ICD-10-CM

## 2023-12-04 DIAGNOSIS — I10 HYPERTENSION, UNSPECIFIED TYPE: ICD-10-CM

## 2023-12-04 DIAGNOSIS — E78.5 HYPERLIPIDEMIA, UNSPECIFIED HYPERLIPIDEMIA TYPE: ICD-10-CM

## 2023-12-05 DIAGNOSIS — E11.42 DIABETIC POLYNEUROPATHY ASSOCIATED WITH TYPE 2 DIABETES MELLITUS (MULTI): ICD-10-CM

## 2023-12-06 RX ORDER — SEMAGLUTIDE 0.68 MG/ML
INJECTION, SOLUTION SUBCUTANEOUS
Qty: 3 ML | Refills: 0 | Status: SHIPPED | OUTPATIENT
Start: 2023-12-06 | End: 2024-01-04

## 2023-12-06 RX ORDER — MONTELUKAST SODIUM 10 MG/1
10 TABLET ORAL DAILY
Qty: 30 TABLET | Refills: 0 | Status: SHIPPED | OUTPATIENT
Start: 2023-12-06 | End: 2024-01-04

## 2023-12-06 RX ORDER — ATORVASTATIN CALCIUM 10 MG/1
10 TABLET, FILM COATED ORAL NIGHTLY
Qty: 30 TABLET | Refills: 0 | Status: SHIPPED | OUTPATIENT
Start: 2023-12-06

## 2023-12-06 RX ORDER — VERAPAMIL HYDROCHLORIDE 240 MG/1
240 TABLET, FILM COATED, EXTENDED RELEASE ORAL DAILY
Qty: 30 TABLET | Refills: 0 | Status: SHIPPED | OUTPATIENT
Start: 2023-12-06 | End: 2024-01-04

## 2023-12-07 RX ORDER — GABAPENTIN 100 MG/1
100 CAPSULE ORAL 4 TIMES DAILY
Qty: 60 CAPSULE | Refills: 1 | Status: SHIPPED | OUTPATIENT
Start: 2023-12-07 | End: 2024-01-17

## 2024-01-03 DIAGNOSIS — J45.909 ASTHMA, UNSPECIFIED ASTHMA SEVERITY, UNSPECIFIED WHETHER COMPLICATED, UNSPECIFIED WHETHER PERSISTENT (HHS-HCC): ICD-10-CM

## 2024-01-03 DIAGNOSIS — I10 HYPERTENSION, UNSPECIFIED TYPE: ICD-10-CM

## 2024-01-03 DIAGNOSIS — E11.9 TYPE 2 DIABETES MELLITUS WITHOUT COMPLICATION, WITHOUT LONG-TERM CURRENT USE OF INSULIN (MULTI): ICD-10-CM

## 2024-01-03 DIAGNOSIS — I51.89 DIASTOLIC DYSFUNCTION: ICD-10-CM

## 2024-01-04 RX ORDER — ALBUTEROL SULFATE 90 UG/1
AEROSOL, METERED RESPIRATORY (INHALATION)
Qty: 8.5 G | Refills: 0 | Status: SHIPPED | OUTPATIENT
Start: 2024-01-04 | End: 2024-02-03

## 2024-01-04 RX ORDER — POTASSIUM CHLORIDE 20 MEQ/1
40 TABLET, EXTENDED RELEASE ORAL DAILY
Qty: 60 TABLET | Refills: 0 | Status: SHIPPED | OUTPATIENT
Start: 2024-01-04 | End: 2024-02-03

## 2024-01-04 RX ORDER — MONTELUKAST SODIUM 10 MG/1
10 TABLET ORAL DAILY
Qty: 30 TABLET | Refills: 0 | Status: SHIPPED | OUTPATIENT
Start: 2024-01-04 | End: 2024-02-03

## 2024-01-04 RX ORDER — BUMETANIDE 2 MG/1
2 TABLET ORAL 2 TIMES DAILY
Qty: 60 TABLET | Refills: 0 | Status: SHIPPED | OUTPATIENT
Start: 2024-01-04 | End: 2024-02-03

## 2024-01-04 RX ORDER — VERAPAMIL HYDROCHLORIDE 240 MG/1
240 TABLET, FILM COATED, EXTENDED RELEASE ORAL DAILY
Qty: 30 TABLET | Refills: 10 | Status: SHIPPED | OUTPATIENT
Start: 2024-01-04

## 2024-01-04 RX ORDER — SEMAGLUTIDE 0.68 MG/ML
0.5 INJECTION, SOLUTION SUBCUTANEOUS
Qty: 3 ML | Refills: 0 | Status: SHIPPED | OUTPATIENT
Start: 2024-01-04 | End: 2024-01-18 | Stop reason: ALTCHOICE

## 2024-01-15 DIAGNOSIS — E11.42 DIABETIC POLYNEUROPATHY ASSOCIATED WITH TYPE 2 DIABETES MELLITUS (MULTI): ICD-10-CM

## 2024-01-17 DIAGNOSIS — E11.42 DIABETIC POLYNEUROPATHY ASSOCIATED WITH TYPE 2 DIABETES MELLITUS (MULTI): ICD-10-CM

## 2024-01-17 RX ORDER — GABAPENTIN 100 MG/1
100 CAPSULE ORAL 4 TIMES DAILY
Qty: 120 CAPSULE | Refills: 0 | Status: SHIPPED | OUTPATIENT
Start: 2024-01-17 | End: 2024-02-01

## 2024-01-18 ENCOUNTER — OFFICE VISIT (OUTPATIENT)
Dept: PRIMARY CARE | Facility: CLINIC | Age: 49
End: 2024-01-18
Payer: COMMERCIAL

## 2024-01-18 VITALS
SYSTOLIC BLOOD PRESSURE: 122 MMHG | TEMPERATURE: 97 F | DIASTOLIC BLOOD PRESSURE: 70 MMHG | WEIGHT: 285 LBS | BODY MASS INDEX: 53.85 KG/M2 | OXYGEN SATURATION: 93 % | HEART RATE: 76 BPM

## 2024-01-18 DIAGNOSIS — G43.909 MIGRAINE WITHOUT STATUS MIGRAINOSUS, NOT INTRACTABLE, UNSPECIFIED MIGRAINE TYPE: ICD-10-CM

## 2024-01-18 DIAGNOSIS — I35.0 AORTIC VALVE STENOSIS, ETIOLOGY OF CARDIAC VALVE DISEASE UNSPECIFIED: ICD-10-CM

## 2024-01-18 DIAGNOSIS — J45.20 MILD INTERMITTENT ASTHMA WITHOUT COMPLICATION (HHS-HCC): ICD-10-CM

## 2024-01-18 DIAGNOSIS — R74.01 ELEVATED ALT MEASUREMENT: ICD-10-CM

## 2024-01-18 DIAGNOSIS — I10 HYPERTENSION, UNSPECIFIED TYPE: ICD-10-CM

## 2024-01-18 DIAGNOSIS — I67.1 ANEURYSM, CAROTID ARTERY, INTERNAL (HHS-HCC): ICD-10-CM

## 2024-01-18 DIAGNOSIS — H90.3 ASYMMETRIC SNHL (SENSORINEURAL HEARING LOSS): ICD-10-CM

## 2024-01-18 DIAGNOSIS — D22.39 MELANOCYTIC NEVI OF OTHER PARTS OF FACE: ICD-10-CM

## 2024-01-18 DIAGNOSIS — E78.5 HYPERLIPIDEMIA, UNSPECIFIED HYPERLIPIDEMIA TYPE: ICD-10-CM

## 2024-01-18 DIAGNOSIS — Z12.11 COLON CANCER SCREENING: ICD-10-CM

## 2024-01-18 DIAGNOSIS — Z00.00 MEDICARE ANNUAL WELLNESS VISIT, SUBSEQUENT: Primary | ICD-10-CM

## 2024-01-18 DIAGNOSIS — E11.9 TYPE 2 DIABETES MELLITUS WITHOUT COMPLICATION, WITHOUT LONG-TERM CURRENT USE OF INSULIN (MULTI): ICD-10-CM

## 2024-01-18 DIAGNOSIS — N90.4 LICHEN SCLEROSUS OF FEMALE GENITALIA: ICD-10-CM

## 2024-01-18 DIAGNOSIS — E11.42 DIABETIC POLYNEUROPATHY ASSOCIATED WITH TYPE 2 DIABETES MELLITUS (MULTI): ICD-10-CM

## 2024-01-18 DIAGNOSIS — G47.33 OBSTRUCTIVE SLEEP APNEA: ICD-10-CM

## 2024-01-18 DIAGNOSIS — Z12.31 SCREENING MAMMOGRAM FOR BREAST CANCER: ICD-10-CM

## 2024-01-18 DIAGNOSIS — E87.6 HYPOKALEMIA: ICD-10-CM

## 2024-01-18 DIAGNOSIS — I1A.0 RESISTANT HYPERTENSION: ICD-10-CM

## 2024-01-18 DIAGNOSIS — J81.1 CHRONIC PULMONARY EDEMA (HHS-HCC): ICD-10-CM

## 2024-01-18 DIAGNOSIS — J30.89 ALLERGIC RHINITIS DUE TO OTHER ALLERGIC TRIGGER, UNSPECIFIED SEASONALITY: ICD-10-CM

## 2024-01-18 DIAGNOSIS — D69.3 IDIOPATHIC THROMBOCYTOPENIC PURPURA (MULTI): ICD-10-CM

## 2024-01-18 DIAGNOSIS — B37.9 MONILIA INFECTION: ICD-10-CM

## 2024-01-18 DIAGNOSIS — K21.9 GASTROESOPHAGEAL REFLUX DISEASE, UNSPECIFIED WHETHER ESOPHAGITIS PRESENT: ICD-10-CM

## 2024-01-18 DIAGNOSIS — F31.9 BIPOLAR AFFECTIVE DISORDER, REMISSION STATUS UNSPECIFIED (MULTI): ICD-10-CM

## 2024-01-18 DIAGNOSIS — E66.09 CLASS 2 OBESITY DUE TO EXCESS CALORIES WITH BODY MASS INDEX (BMI) OF 36.0 TO 36.9 IN ADULT, UNSPECIFIED WHETHER SERIOUS COMORBIDITY PRESENT: ICD-10-CM

## 2024-01-18 DIAGNOSIS — E83.42 HYPOMAGNESEMIA: ICD-10-CM

## 2024-01-18 DIAGNOSIS — E66.01 CLASS 3 SEVERE OBESITY DUE TO EXCESS CALORIES WITH SERIOUS COMORBIDITY AND BODY MASS INDEX (BMI) OF 50.0 TO 59.9 IN ADULT (MULTI): ICD-10-CM

## 2024-01-18 DIAGNOSIS — N92.6 IRREGULAR MENSTRUAL CYCLE: ICD-10-CM

## 2024-01-18 PROBLEM — N92.1 MENOMETRORRHAGIA: Status: RESOLVED | Noted: 2023-09-07 | Resolved: 2024-01-18

## 2024-01-18 PROBLEM — K04.7 DENTAL INFECTION: Status: RESOLVED | Noted: 2023-09-29 | Resolved: 2024-01-18

## 2024-01-18 PROBLEM — R35.0 URINARY FREQUENCY: Status: RESOLVED | Noted: 2023-09-07 | Resolved: 2024-01-18

## 2024-01-18 PROBLEM — H93.293 OTHER ABNORMAL AUDITORY PERCEPTIONS, BILATERAL: Status: RESOLVED | Noted: 2023-09-07 | Resolved: 2024-01-18

## 2024-01-18 PROBLEM — R42 DIZZINESS AND GIDDINESS: Status: RESOLVED | Noted: 2023-09-07 | Resolved: 2024-01-18

## 2024-01-18 PROBLEM — R06.02 SHORTNESS OF BREATH: Status: RESOLVED | Noted: 2023-09-07 | Resolved: 2024-01-18

## 2024-01-18 PROBLEM — N93.8 DYSFUNCTIONAL UTERINE BLEEDING: Status: RESOLVED | Noted: 2023-09-07 | Resolved: 2024-01-18

## 2024-01-18 PROBLEM — F17.290 NICOTINE DEPENDENCE DUE TO VAPING TOBACCO PRODUCT: Status: RESOLVED | Noted: 2023-09-07 | Resolved: 2024-01-18

## 2024-01-18 PROBLEM — J01.00 ACUTE MAXILLARY SINUSITIS: Status: RESOLVED | Noted: 2023-04-28 | Resolved: 2024-01-18

## 2024-01-18 PROBLEM — R06.00 DYSPNEA: Status: RESOLVED | Noted: 2023-09-07 | Resolved: 2024-01-18

## 2024-01-18 PROCEDURE — 3078F DIAST BP <80 MM HG: CPT | Performed by: PEDIATRICS

## 2024-01-18 PROCEDURE — 80053 COMPREHEN METABOLIC PANEL: CPT | Performed by: PEDIATRICS

## 2024-01-18 PROCEDURE — 82306 VITAMIN D 25 HYDROXY: CPT | Performed by: PEDIATRICS

## 2024-01-18 PROCEDURE — 3066F NEPHROPATHY DOC TX: CPT | Performed by: PEDIATRICS

## 2024-01-18 PROCEDURE — 3008F BODY MASS INDEX DOCD: CPT | Performed by: PEDIATRICS

## 2024-01-18 PROCEDURE — 83036 HEMOGLOBIN GLYCOSYLATED A1C: CPT | Performed by: PEDIATRICS

## 2024-01-18 PROCEDURE — 80074 ACUTE HEPATITIS PANEL: CPT

## 2024-01-18 PROCEDURE — 85025 COMPLETE CBC W/AUTO DIFF WBC: CPT | Performed by: PEDIATRICS

## 2024-01-18 PROCEDURE — 36415 COLL VENOUS BLD VENIPUNCTURE: CPT

## 2024-01-18 PROCEDURE — 1036F TOBACCO NON-USER: CPT | Performed by: PEDIATRICS

## 2024-01-18 PROCEDURE — G0439 PPPS, SUBSEQ VISIT: HCPCS | Performed by: PEDIATRICS

## 2024-01-18 PROCEDURE — G0008 ADMIN INFLUENZA VIRUS VAC: HCPCS | Performed by: PEDIATRICS

## 2024-01-18 PROCEDURE — 90686 IIV4 VACC NO PRSV 0.5 ML IM: CPT | Performed by: PEDIATRICS

## 2024-01-18 PROCEDURE — 99214 OFFICE O/P EST MOD 30 MIN: CPT | Performed by: PEDIATRICS

## 2024-01-18 PROCEDURE — 80061 LIPID PANEL: CPT | Performed by: PEDIATRICS

## 2024-01-18 PROCEDURE — 84443 ASSAY THYROID STIM HORMONE: CPT | Performed by: PEDIATRICS

## 2024-01-18 PROCEDURE — 3074F SYST BP LT 130 MM HG: CPT | Performed by: PEDIATRICS

## 2024-01-18 PROCEDURE — 83735 ASSAY OF MAGNESIUM: CPT

## 2024-01-18 PROCEDURE — 4010F ACE/ARB THERAPY RXD/TAKEN: CPT | Performed by: PEDIATRICS

## 2024-01-18 RX ORDER — NYSTATIN 100000 U/G
CREAM TOPICAL 2 TIMES DAILY
Qty: 30 G | Refills: 0 | Status: SHIPPED | OUTPATIENT
Start: 2024-01-18 | End: 2024-01-25

## 2024-01-18 RX ORDER — GABAPENTIN 100 MG/1
100 CAPSULE ORAL 4 TIMES DAILY
Qty: 120 CAPSULE | Refills: 0 | OUTPATIENT
Start: 2024-01-18

## 2024-01-18 RX ORDER — OMEPRAZOLE 40 MG/1
40 CAPSULE, DELAYED RELEASE ORAL DAILY
Qty: 30 CAPSULE | Refills: 10 | Status: CANCELLED | OUTPATIENT
Start: 2024-01-18

## 2024-01-18 RX ORDER — LOSARTAN POTASSIUM 100 MG/1
100 TABLET ORAL DAILY
Qty: 90 TABLET | Refills: 1 | Status: SHIPPED | OUTPATIENT
Start: 2024-01-18 | End: 2024-06-08

## 2024-01-18 ASSESSMENT — ACTIVITIES OF DAILY LIVING (ADL)
DRESSING: INDEPENDENT
BATHING: INDEPENDENT
GROCERY_SHOPPING: INDEPENDENT
MANAGING_FINANCES: INDEPENDENT
TAKING_MEDICATION: INDEPENDENT
DOING_HOUSEWORK: INDEPENDENT

## 2024-01-18 ASSESSMENT — PATIENT HEALTH QUESTIONNAIRE - PHQ9
2. FEELING DOWN, DEPRESSED OR HOPELESS: NOT AT ALL
1. LITTLE INTEREST OR PLEASURE IN DOING THINGS: NOT AT ALL
SUM OF ALL RESPONSES TO PHQ9 QUESTIONS 1 AND 2: 0

## 2024-01-18 NOTE — ASSESSMENT & PLAN NOTE
Take albuterol as needed, only when she gets cold-like symptoms  Took it once or twice in last three months

## 2024-01-18 NOTE — PROGRESS NOTES
Subjective   Patient ID: Leslee Rushing is a 48 y.o. female who presents for Medicare Wellness    HPI   Due to see gynecologist;  Exercising more  Mammogram done on 3/10/23 which was negative.   Infrequent periods since ablation was done  Review of Systems  No CP  Objective   /70   Pulse 76   Temp 36.1 °C (97 °F)   Wt 129 kg (285 lb)   SpO2 93%   BMI 53.85 kg/m²     Physical Exam  Vitals reviewed.   Constitutional:       General: She is not in acute distress.  HENT:      Head: Normocephalic.      Right Ear: Tympanic membrane normal.      Left Ear: Tympanic membrane normal.      Nose: Nose normal.      Mouth/Throat:      Pharynx: Oropharynx is clear.   Cardiovascular:      Rate and Rhythm: Normal rate and regular rhythm.      Heart sounds: Normal heart sounds.   Pulmonary:      Breath sounds: Normal breath sounds.   Abdominal:      Palpations: Abdomen is soft.      Tenderness: There is no abdominal tenderness.   Musculoskeletal:         General: No tenderness.   Skin:     Findings: No rash.      Comments: Sensation intact  Good pulses   Neurological:      General: No focal deficit present.      Mental Status: She is alert.   Psychiatric:         Mood and Affect: Mood normal.         Assessment/Plan   Problem List Items Addressed This Visit             ICD-10-CM    Allergic rhinitis J30.9     Continue Flonase, Singular, and Loratidine         Asymmetric SNHL (sensorineural hearing loss) H90.3     Audiologist Visit 7/27/23         Bipolar disorder (CMS/[facility]) F31.9     Continue Lamotrigine per psychiatrist at [facility]         Diabetes mellitus (CMS/[facility]) E11.9     A1c 6.2 (4/27/23)  Blood Sugars at home usually 102-103s  Continue Ozempic and Metformin             Relevant Medications    semaglutide (OZEMPIC) 1 mg/dose (4 mg/3 mL) pen injector    Other Relevant Orders    Hemoglobin A1C    Diabetic neuropathy (CMS/[facility]) E11.40     Controlled with Gabapentin; every once in a while may feel a shock in the  bottom of the feet         Hyperlipidemia E78.5     Continue Atorvastatin   Cholesterol 162, HDL 44, LDL 60 (4/27/23)         Relevant Orders    Lipid Panel    Thyroid Stimulating Hormone    Lipid Panel    Idiopathic thrombocytopenic purpura (CMS/[facility]) D69.3     Last platelet count 273 last year         Migraine headache G43.909     None since October on Topamax; peanuts trigger migraines         Hypertension I10     Continue  Verapamil and Bumex. Ran out of Losartan but has been advised to take it         Relevant Medications    losartan (Cozaar) 100 mg tablet    Class 3 severe obesity due to excess calories with serious comorbidity and body mass index (BMI) of 50.0 to 59.9 in adult (CMS/[facility]) E66.01, Z68.43    Relevant Orders    Comprehensive Metabolic Panel    Referral to Bariatric Surgery    Obstructive sleep apnea G47.33     Uses CPAP nightly         Relevant Orders    CBC    Vitamin D 25-Hydroxy,Total (for eval of Vitamin D levels)    Lipid Panel    Mild intermittent asthma without complication J45.20     Take albuterol as needed, only when she gets cold-like symptoms  Took it once or twice in last three months         Aneurysm, carotid artery, internal I67.1     Dr Parul Bruce, vascular specialist, is following her         Elevated ALT measurement R74.01     Last alt normal; never had liver ultrasound         Relevant Orders    US abdomen limited liver    Hepatitis panel, acute    Hypokalemia E87.6     Continue Potassium Supplement         Hypomagnesemia E83.42    Relevant Orders    Magnesium    Irregular menstrual cycle N92.6    Lichen sclerosus of female genitalia N90.4     On steroid cream per GYN (clobetasol)         Melanocytic nevi of other parts of face D22.39     Saw dermatologist         Josefina young B37.9     Has rash under abdominal folds         Relevant Medications    nystatin (Mycostatin) cream    Pulmonary edema J81.1     Not short of breath like she was since she has been on Bumex          Relevant Orders    XR chest 2 views    Aortic stenosis I35.0    Relevant Orders    Transthoracic Echo (TTE) Complete     Other Visit Diagnoses         Codes    Medicare annual wellness visit, subsequent    -  Primary Z00.00    Gastroesophageal reflux disease, unspecified whether esophagitis present     K21.9    Colon cancer screening     Z12.11    Relevant Orders    Cologuard® colon cancer screening    Class 2 obesity due to excess calories with body mass index (BMI) of 36.0 to 36.9 in adult, unspecified whether serious comorbidity present     E66.09, Z68.36    Relevant Orders    Vitamin D 25-Hydroxy,Total (for eval of Vitamin D levels)

## 2024-01-19 LAB
HAV IGM SER QL: NONREACTIVE
HBV CORE IGM SER QL: NONREACTIVE
HBV SURFACE AG SERPL QL IA: NONREACTIVE
HCV AB SER QL: NONREACTIVE
MAGNESIUM SERPL-MCNC: 1.96 MG/DL (ref 1.6–2.4)

## 2024-01-23 DIAGNOSIS — E55.9 VITAMIN D DEFICIENCY: Primary | ICD-10-CM

## 2024-01-23 RX ORDER — ERGOCALCIFEROL 1.25 MG/1
50000 CAPSULE ORAL
Qty: 12 CAPSULE | Refills: 0 | Status: SHIPPED | OUTPATIENT
Start: 2024-01-23 | End: 2024-04-16

## 2024-01-30 ENCOUNTER — TELEPHONE (OUTPATIENT)
Dept: SURGERY | Facility: CLINIC | Age: 49
End: 2024-01-30
Payer: COMMERCIAL

## 2024-01-30 NOTE — TELEPHONE ENCOUNTER
Thank you for speaking with me earlier today, per our conversation I have canceled the 1/31/24 appointment and rescheduled your initial visit with Dr. Abraham on 2/21/24 at 1:30PM at [facility] (inside [facility], main floor in the Specialty Clinic).  REMEMBER:  You will need to ARRIVE AT:  11:45AM for the required New Patient Class with Dr. Abraham and her RN that begins at Noon.  We look forward to seeing you, Melany Hall, [facility] Clinic Nurse.

## 2024-01-31 ENCOUNTER — APPOINTMENT (OUTPATIENT)
Dept: SURGERY | Facility: CLINIC | Age: 49
End: 2024-01-31
Payer: COMMERCIAL

## 2024-01-31 DIAGNOSIS — E11.42 DIABETIC POLYNEUROPATHY ASSOCIATED WITH TYPE 2 DIABETES MELLITUS (MULTI): ICD-10-CM

## 2024-02-01 ENCOUNTER — NUTRITION (OUTPATIENT)
Dept: SURGERY | Facility: CLINIC | Age: 49
End: 2024-02-01
Payer: COMMERCIAL

## 2024-02-01 VITALS — BODY MASS INDEX: 53.62 KG/M2 | HEIGHT: 61 IN | WEIGHT: 284 LBS

## 2024-02-01 DIAGNOSIS — J45.909 ASTHMA, UNSPECIFIED ASTHMA SEVERITY, UNSPECIFIED WHETHER COMPLICATED, UNSPECIFIED WHETHER PERSISTENT (HHS-HCC): ICD-10-CM

## 2024-02-01 DIAGNOSIS — I51.89 DIASTOLIC DYSFUNCTION: ICD-10-CM

## 2024-02-01 RX ORDER — GABAPENTIN 100 MG/1
100 CAPSULE ORAL 4 TIMES DAILY
Qty: 120 CAPSULE | Refills: 3 | Status: SHIPPED | OUTPATIENT
Start: 2024-02-01 | End: 2024-05-15

## 2024-02-01 NOTE — PROGRESS NOTES
"Initial Bariatric Nutrition Assessment    Surgeon:  Jarad  Patient is considering: Sleeve gastrectomy/RNYGB    ASSESSMENT:  Current weight:   Vitals:    02/01/24 1033   Weight: 129 kg (284 lb)     Ht:  1.549 m (5' 1\")   BMI:  Body mass index is 53.66 kg/m².       Pre-Op Excess Body Weight (EBW):   152lbs        This is a 48 y.o. female with morbid obesity (Body mass index is 53.66 kg/m².) who presents to clinic for consideration of bariatric surgery. she has attempted and failed multiple diet and exercise regimens for weight loss.   Initial Onset of obesity was at age 17 .  Their goal for surgery is to   reduce health risks . The patient has tried multiple diets to lose weight including  exercise, keto, OTC diet pills, eliminating red meat . The patient was most successful with the  eliminating red meat . The most pounds lost on this diet were 30 lbs. The patient considers their dietary weakness to be  emotional eating  The patient reports a  highest weight ever of 315 pounds and lowest weight ever of 284 pounds. Has been eating more veggies and watching portion sizes to get her to her current weight.     Current diet: has been limiting her carbs last HbA1c was 6.2; on a 64oz fluid restriction- holds onto water and taking a diuretic      The patient does not exercise because she does not want to sweat. Walking up and down steps in house.     Food allergies/intolerances:  peanut intolerance, lactose intolerant (large amounts and milk)  Chewing/Swallowing/Dentition: no  Nausea / Vomiting / Hx Gastroparesis:  no  Diarrhea/ Constipation: no  Smoking/Tobacco use: nicotine free vape- weaning off  Vitamins/Minerals supplements: 50,000mg of riboflavin (very low), Mg, K  Hours of sleep/night: 8-10 hours  Work: [email or web address]     Medications:     Current Outpatient Medications:     albuterol 90 mcg/actuation inhaler, INHALE TWO (2) PUFFS BY MOUTH EVERY 4-6 HOURS AS NEEDED FOR WHEEZING, Disp: 8.5 g, Rfl: 0    atorvastatin (Lipitor) 10 " mg tablet, TAKE 1 TABLET (10 MG) BY MOUTH ONCE DAILY AT BEDTIME., Disp: 30 tablet, Rfl: 0    azelastine (Astelin) 137 mcg (0.1 %) nasal spray, ADMINISTER 2 SPRAYS INTO EACH NOSTRIL 2 TIMES A DAY., Disp: 30 mL, Rfl: 10    blood-glucose meter (OneTouch Verio Flex meter) misc, USE TO TEST BLOOD SUGAR DAILY, Disp: 1 each, Rfl: 10    bumetanide (Bumex) 2 mg tablet, TAKE 1 TABLET BY MOUTH TWICE DAILY, Disp: 60 tablet, Rfl: 0    busPIRone (Buspar) 30 mg tablet, Take 1 tablet (30 mg) by mouth 2 times a day., Disp: , Rfl:     clobetasol (Temovate) 0.05 % ointment, 1 APPLICATION EXTERNALLY TWICE A DAY AS NEEDED ITCHING, Disp: , Rfl:     ergocalciferol (Vitamin D-2) 1.25 MG (25106 UT) capsule, Take 1 capsule (50,000 Units) by mouth 1 (one) time per week., Disp: 12 capsule, Rfl: 0    fluticasone (Flonase) 50 mcg/actuation nasal spray, Administer 2 sprays into each nostril once daily., Disp: 16 g, Rfl: 5    fluticasone-umeclidin-vilanter (Trelegy Ellipta) 200-62.5-25 mcg blister with device, Inhale 1 puff once daily., Disp: 1 each, Rfl: 0    gabapentin (Neurontin) 100 mg capsule, TAKE 1 CAPSULE BY MOUTH FOUR TIMES DAILY, Disp: 120 capsule, Rfl: 3    hydrOXYzine HCL (Atarax) 50 mg tablet, Take 1 tablet (50 mg) by mouth 3 times a day., Disp: , Rfl:     lactulose 20 gram/30 mL oral solution, GIVE 30 ML EVERY 2 HOURS UNTIL SHE HAS A BOWEL MOVEMENT--THEN TWICE A DAY FOR MAINTENANCE, Disp: 2000 mL, Rfl: 11    lamoTRIgine (LaMICtal) 100 mg tablet, Take 1 tablet (100 mg) by mouth once daily., Disp: , Rfl:     loratadine (Claritin) 10 mg tablet, Take 1 tablet (10 mg) by mouth once daily., Disp: 90 tablet, Rfl: 3    losartan (Cozaar) 100 mg tablet, Take 1 tablet (100 mg) by mouth once daily., Disp: 90 tablet, Rfl: 1    lurasidone (Latuda) 60 mg tablet, Take 1 tablet (60 mg) by mouth., Disp: , Rfl:     magnesium oxide (Mag-Ox) 400 mg (241.3 mg magnesium) tablet, TAKE 2 TABLETS (800 MG) BY MOUTH 2 TIMES A DAY., Disp: 120 tablet, Rfl: 10     metFORMIN XR (Glucophage-XR) 500 mg 24 hr tablet, Take 4 tablets (2,000 mg) by mouth once daily., Disp: 360 tablet, Rfl: 1    montelukast (Singulair) 10 mg tablet, TAKE 1 TABLET BY MOUTH EVERY DAY, Disp: 30 tablet, Rfl: 0    omeprazole (PriLOSEC) 40 mg DR capsule, TAKE 1 CAPSULE (40 MG) BY MOUTH ONCE DAILY., Disp: 30 capsule, Rfl: 10    potassium chloride CR 20 mEq ER tablet, TAKE 2 TABLETS BY MOUTH DAILY, Disp: 60 tablet, Rfl: 0    semaglutide (OZEMPIC) 1 mg/dose (4 mg/3 mL) pen injector, Inject 1 mg under the skin 1 (one) time per week., Disp: 3 mL, Rfl: 2    topiramate (Topamax) 50 mg tablet, TAKE 1/2 TABLET BY MOUTH IN THE MORNING AND 1 TABLET AT NIGHT FOR 1-2 WEEKS THEN INCREASE TO 1 TABLET TWICE DAILY, Disp: 60 tablet, Rfl: 2    verapamil SR (Calan-SR) 240 mg ER tablet, TAKE 1 TABLET BY MOUTH EVERY DAY, Disp: 30 tablet, Rfl: 10      24 HOUR RECALL/DIET HISTORY:  Breakfast:  skip or sandwich   Snack:    Lunch: chicken nuggets or burger from restaurant or skips   Snack: lays chips   Dinner: fish/roast/steak w/veggies or scalloped potatoes or spaghetti or grilled cheese  Snack:   Beverages: coffee w/SF creamer (1 tbsp), water   Alcohol: 1x every month     Person responsible for cooking & shopping?   Self and daughters   How often do you eat sweet snacks?   2-3x per week  How often do you eat savory snacks?  Every day- lays chips   How often do you eat out?   1-2x per week   Do you feel overly stuffed? Yes   Binge Eating?  no  Night Eating?  No   Emotional Eating?  Stress, depression       READINESS TO LEARN:  Motivation to learn: Interested        Understanding of instruction: Good  Anticipated Compliance: Good       Family Support: has not told family           Educational Materials Provided:    Schedules for support group and  [facility] classes   1400  Calorie meal plan   Goals sheet  Chair exercise     Nutrition assessment completed today.  Pt will be scheduled for video education class to discuss the 2 week pre op  diet, post op protein and fluid goals, vitamin and mineral supplementation, exercise goals, and post op diet progression closer to the time of surgery    Patient is seeking RNYGB/Sleeve gastrectomy     Instructed pt on a 1400 calorie meal plan and to measure and record intake daily. Advised to eat 3 meals per and 1-2 high protein snacks.  Recommend eating 3-4 oz per meal. Discussed ways to reduce emotional eating. Reviewed the postop behaviors to start practicing.  Set a goal to start doing exercise of choices for  2-3x per week.     The pt feeling reluctant to getting surgery, but with plans to work with Jarad to see if she is a good candidate. Noted feeling a little overwhelmed with her nutrition goals, and encouraged her to tackle one goal at a time.     Patient was receptive to nutritional recommendations, asked numerous questions, and verbalized understanding of the weight loss surgery diet.  Patient expressed understanding about the importance of strict dietary compliance post-surgery to avoid nutritional deficiencies and achieve optimal weight loss and verbalized intent to follow dietary recommendations.    Malnutrition Screening:   Significant unintentional weight loss? n/a   Eating less than 75% of usual intake for more than 2 weeks? n/a      Nutrition Diagnosis:   Overweight/obesity related to excess energy intake as evidenced by BMI >= 40 kg/m^2.  Food- and nutrition-related knowledge deficit related to lack of prior exposure to surgical weight loss information as evidenced by pt new to surgical program.    Nutrition Interventions:   Modify type and amount of food and nutrients within meals and snacks.  Comprehensive Nutrition Education    Recommendations:  1. Begin following your meal plan.  Measure and record intake daily.   2. Structure meal patterns, eating three meals and 1-2 snacks per day. Try protein shakes for breakfast like premier protein or Fairlife (30g of protein). Make a grocery list to  help you plan your meals.   3. Aim for 3-4 oz (20g) protein per meal.  Have 1-2 high protein snacks that are 10-20 g protein each.  You can try a tuna or chicken packet, Greek yogurt, 2 string cheeses, Protein bars like Quest, Pure Protein, Premier, or Built Bars. you can also try protein chips form Quest or Atkins.    4. Drink 64oz of calorie-free, caffeine-free, and non-carbonated beverages. Practice taking sips.   5. Practice no drinking 30 minutes before meals, nothing with meals and wait 30 minutes after meals to drink again. Make meals last 30 minutes-chew thoroughly.   6. Limit or omit eating out/sweets/savory snacks to 1-2 times per week.  7. Begin daily multivitamin.  Fl[employer] Complete has everything you need.  8. Begin walking 2-3x per week. Increase physical activity by 10-15 minutes as tolerated to an end goal of 60 minutes 5 x per week. Consistency is the key.  9. Identify emotional eating. Only snack when you feel hungry.     Pre-op Goal weight: lose 5% of body weight    Nutrition Monitoring and Evaluation: 1-2 pound weight loss per week  Criteria: weight check  Need for Follow-up: one month     Patient does meet [employer] Health guidelines for weight loss surgery, however needs to demonstrate consistent effort in making dietary changes before giving clearance. It is anticipated that the patient will need at least 2 nutritional follow-up visits prior to clearance for surgery.    Fadia Spaulding MS, RD, LD  Phone: [phone]

## 2024-02-02 DIAGNOSIS — I51.89 DIASTOLIC DYSFUNCTION: ICD-10-CM

## 2024-02-03 RX ORDER — POTASSIUM CHLORIDE 20 MEQ/1
40 TABLET, EXTENDED RELEASE ORAL DAILY
Qty: 90 TABLET | Refills: 0 | Status: SHIPPED | OUTPATIENT
Start: 2024-02-03 | End: 2024-03-04

## 2024-02-03 RX ORDER — ALBUTEROL SULFATE 90 UG/1
AEROSOL, METERED RESPIRATORY (INHALATION)
Qty: 8.5 G | Refills: 10 | Status: SHIPPED | OUTPATIENT
Start: 2024-02-03

## 2024-02-03 RX ORDER — MONTELUKAST SODIUM 10 MG/1
10 TABLET ORAL DAILY
Qty: 30 TABLET | Refills: 10 | Status: SHIPPED | OUTPATIENT
Start: 2024-02-03

## 2024-02-03 RX ORDER — BUMETANIDE 2 MG/1
2 TABLET ORAL 2 TIMES DAILY
Qty: 180 TABLET | Refills: 0 | Status: SHIPPED | OUTPATIENT
Start: 2024-02-03 | End: 2024-05-01

## 2024-02-12 ENCOUNTER — HOSPITAL ENCOUNTER (OUTPATIENT)
Dept: RADIOLOGY | Facility: CLINIC | Age: 49
Discharge: HOME | End: 2024-02-12
Payer: COMMERCIAL

## 2024-02-12 ENCOUNTER — HOSPITAL ENCOUNTER (OUTPATIENT)
Dept: CARDIOLOGY | Facility: CLINIC | Age: 49
Discharge: HOME | End: 2024-02-12
Payer: COMMERCIAL

## 2024-02-12 VITALS — WEIGHT: 284 LBS | BODY MASS INDEX: 53.62 KG/M2 | HEIGHT: 61 IN

## 2024-02-12 DIAGNOSIS — J81.1 CHRONIC PULMONARY EDEMA (HHS-HCC): ICD-10-CM

## 2024-02-12 DIAGNOSIS — Z12.31 SCREENING MAMMOGRAM FOR BREAST CANCER: ICD-10-CM

## 2024-02-12 LAB
AORTIC VALVE MEAN GRADIENT: 47 MMHG
AORTIC VALVE PEAK VELOCITY: 4.51 M/S
AV PEAK GRADIENT: 81.3 MMHG
AVA (PEAK VEL): 1.1 CM2
AVA (VTI): 1.22 CM2
EJECTION FRACTION APICAL 4 CHAMBER: 81.9
EJECTION FRACTION: 81 %
LEFT ATRIUM VOLUME AREA LENGTH INDEX BSA: 34.4 ML/M2
LEFT VENTRICLE INTERNAL DIMENSION DIASTOLE: 4.86 CM (ref 3.5–6)
LEFT VENTRICULAR OUTFLOW TRACT DIAMETER: 1.92 CM
MITRAL VALVE E/A RATIO: 1.19
MITRAL VALVE E/E' RATIO: 14.58

## 2024-02-12 PROCEDURE — 71046 X-RAY EXAM CHEST 2 VIEWS: CPT

## 2024-02-12 PROCEDURE — 93306 TTE W/DOPPLER COMPLETE: CPT | Performed by: INTERNAL MEDICINE

## 2024-02-12 PROCEDURE — 76705 ECHO EXAM OF ABDOMEN: CPT

## 2024-02-12 PROCEDURE — 2500000004 HC RX 250 GENERAL PHARMACY W/ HCPCS (ALT 636 FOR OP/ED): Performed by: STUDENT IN AN ORGANIZED HEALTH CARE EDUCATION/TRAINING PROGRAM

## 2024-02-12 PROCEDURE — 93306 TTE W/DOPPLER COMPLETE: CPT

## 2024-02-12 PROCEDURE — 77067 SCR MAMMO BI INCL CAD: CPT

## 2024-02-12 RX ADMIN — PERFLUTREN 2 ML OF DILUTION: 6.52 INJECTION, SUSPENSION INTRAVENOUS at 09:49

## 2024-02-13 DIAGNOSIS — K76.0 FATTY LIVER: Primary | ICD-10-CM

## 2024-02-19 ENCOUNTER — TELEPHONE (OUTPATIENT)
Dept: SURGERY | Facility: CLINIC | Age: 49
End: 2024-02-19
Payer: COMMERCIAL

## 2024-02-19 PROBLEM — I35.0 SEVERE AORTIC STENOSIS BY PRIOR ECHOCARDIOGRAM: Chronic | Status: ACTIVE | Noted: 2024-01-18

## 2024-02-19 NOTE — TELEPHONE ENCOUNTER
Elkin Camilo, just let you a voice mail to confirm your scheduled initial visit with Dr. Abraham on 2/21/24 at [facility] (inside [facility], main floor in the Specialty Clinic).  Please remember you will need to arrive at:  11:45 AM for the required New Patient Class with Dr. Abraham.  Please return my call to:  [phone] so we can complete your previsit review questions.  We look forward to seeing you, Melany Hall LPN Clinic Nurse.

## 2024-02-20 ENCOUNTER — TELEPHONE (OUTPATIENT)
Dept: SURGERY | Facility: CLINIC | Age: 49
End: 2024-02-20
Payer: COMMERCIAL

## 2024-02-20 ENCOUNTER — OFFICE VISIT (OUTPATIENT)
Dept: CARDIOLOGY | Facility: CLINIC | Age: 49
End: 2024-02-20
Payer: COMMERCIAL

## 2024-02-20 VITALS
BODY MASS INDEX: 50.79 KG/M2 | SYSTOLIC BLOOD PRESSURE: 114 MMHG | HEIGHT: 61 IN | DIASTOLIC BLOOD PRESSURE: 71 MMHG | WEIGHT: 269 LBS | OXYGEN SATURATION: 100 % | HEART RATE: 74 BPM

## 2024-02-20 DIAGNOSIS — E66.01 CLASS 3 SEVERE OBESITY DUE TO EXCESS CALORIES WITH SERIOUS COMORBIDITY AND BODY MASS INDEX (BMI) OF 50.0 TO 59.9 IN ADULT (MULTI): ICD-10-CM

## 2024-02-20 DIAGNOSIS — E78.2 MIXED HYPERLIPIDEMIA: ICD-10-CM

## 2024-02-20 DIAGNOSIS — I10 PRIMARY HYPERTENSION: ICD-10-CM

## 2024-02-20 DIAGNOSIS — I35.0 SEVERE AORTIC STENOSIS BY PRIOR ECHOCARDIOGRAM: Primary | Chronic | ICD-10-CM

## 2024-02-20 LAB
ATRIAL RATE: 74 BPM
P AXIS: 36 DEGREES
P OFFSET: 148 MS
P ONSET: 110 MS
PR INTERVAL: 206 MS
Q ONSET: 213 MS
QRS COUNT: 12 BEATS
QRS DURATION: 104 MS
QT INTERVAL: 450 MS
QTC CALCULATION(BAZETT): 499 MS
QTC FREDERICIA: 483 MS
R AXIS: 42 DEGREES
T AXIS: 56 DEGREES
T OFFSET: 438 MS
VENTRICULAR RATE: 74 BPM

## 2024-02-20 PROCEDURE — 99213 OFFICE O/P EST LOW 20 MIN: CPT | Performed by: INTERNAL MEDICINE

## 2024-02-20 PROCEDURE — 93005 ELECTROCARDIOGRAM TRACING: CPT | Performed by: INTERNAL MEDICINE

## 2024-02-20 PROCEDURE — 3074F SYST BP LT 130 MM HG: CPT | Performed by: INTERNAL MEDICINE

## 2024-02-20 PROCEDURE — 3078F DIAST BP <80 MM HG: CPT | Performed by: INTERNAL MEDICINE

## 2024-02-20 PROCEDURE — 4010F ACE/ARB THERAPY RXD/TAKEN: CPT | Performed by: INTERNAL MEDICINE

## 2024-02-20 PROCEDURE — 3008F BODY MASS INDEX DOCD: CPT | Performed by: INTERNAL MEDICINE

## 2024-02-20 PROCEDURE — 93010 ELECTROCARDIOGRAM REPORT: CPT | Performed by: INTERNAL MEDICINE

## 2024-02-20 ASSESSMENT — ENCOUNTER SYMPTOMS
DEPRESSION: 0
LOSS OF SENSATION IN FEET: 0
OCCASIONAL FEELINGS OF UNSTEADINESS: 0

## 2024-02-20 ASSESSMENT — PAIN SCALES - GENERAL: PAINLEVEL: 0-NO PAIN

## 2024-02-20 NOTE — PATIENT INSTRUCTIONS
"It was a pleasure seeing you today. I would like to see you back in clinic in about 6 Months You can call my office if questions arise between now and our next visit.      DONT FORGET THAT YOU AND YAZ MADE A PINKIE PROMISE.      WATCH AND EXERCISE WITH SHERRI PEREIRA ON YOUTUBE TO HELP YOU GET IN SHAPE AND LOSE WEIGHT      Continue on your current medications.  -- PLease Decrease the amount of fluid that you are drinking. This is \"undoing\" what the diuretics do.      -- Continue Bumex at 2 mg Twice a Day      -- INcrease your magnesium supplements to 800 mg Twice A Day      -- Begin Using POtassium Supplements Daily      -- Get blood work about 1-2 weeks before we see each other     -- Schedule an Echocardiogram in 6 months to keep track of your heart valve.      Please try to cut back on the number of cigarettes. He smoked. Try to increase the interval between cigarettes and try to use substitutes such as sugar-free candy carrots,celery sticks as in between.  Once again down to less than half a pack a day try to go cold turkey.   try to designate areas in the your home as smoke-free areas. Try to reduce the number of ashtrays and other reminders of smoking. Try to keep any major something that you dislike next to your cigarette pack to try to develop a mental aversion to smoking     I would like you to work on the amount of exercise that you are doing. Every little step that you can take to increase your activity will pay off in the long-term for you. Please try and do more of your stairs. Try and go up and down them at least once to twice per day. This will help increase your activity levels and improve your overall cardiovascular health.     - We recommend you follow a heart healthy diet. Watch food labels and try not to eat more than 2,500 mg of sodium per day. Avoid foods high in salt like processed meats (lunch meats, lazaro, and sausage), processed foods (boxed dinners, canned soups), fried and fast foods. " "Monitor serving sizes and if the sodium per serving size is more than 200 mg, avoid those foods. If the sodium per serving size is between 100-200 mg, you can use those in limited quantities. Try to choose foods where the amount of sodium per serving size is less than 100 mg. Try to eat a diet rich in fruits and vegetables, whole grains, low fat dairy products, skinless poultry and fish, nuts, beans, non-tropical vegetable oils. Limit saturated fat, trans fat, sodium, red meats, and sugar-sweetened beverages.   Limit alcohol      -The combination of a reduced-calorie diet and increased physical activity is recommended. Adults should aim to get at least 150 minutes of moderate physical activity per week (30 minutes of moderate physical activities at least 5 days per week). Examples of moderate physical activities include brisk walking, swimming, aerobic dancing, heavy gardening, jumping rope, bicycling 10 MPH or faster, tennis, hiking uphill or with a heavy backpack. Please let us know if you would like to learn more about your nutrition and calories and additional options including weight loss programs to help you reach your goal.      -If you smoke, stop smoking. iIf you stop smoking you can help get rid of a major source of stress to your heart. Smoking makes your heart rate and blood pressure go up and increases your risk or developing cardiovascular diseases and worsen symptoms associated with heart failure.      -Obtain a BP monitor and monitor your BP daily. Check it around the same time each day; at least 1 hour after taking your medications. Record your BP in a log and bring your log with you to your doctors appointment.      -F/u with your PCP as recommended.      - If you are having problems with medications, consider using \"GoodRx\" to help locate a more cost effective measure to obtain medications. We are happy to supply written prescriptions if needed to allow you to obtain your medications from " different pharmacies. Additionally, if you are having issues with mail order delivery, please let us know. We can send a limited supply of your medications to your local pharmacy.

## 2024-02-20 NOTE — TELEPHONE ENCOUNTER
Thank you for speaking with me earlier today to rescheduled your visit with Dr. Abraham to 3/6/24 2PM at [facility] (inside [facility], main floor in the Specialty Clinic).  Please ARRIVE at:  11:45 AM for the required New Patient Class with Dr. Abraham and her RN Coordinator oJycelyn.  We look forward to meeting you!  Melany Hall LPN Clinic Nurse.

## 2024-02-20 NOTE — PROGRESS NOTES
"Chief Complaint:   Follow-up (1 year fuv, patient states she has had an increase in SOB with exertion the last 2-3 days)     History Of Present Illness:    Leslee Rushing is a 48 y.o. female presenting with  PMHx significant for impaired diastolic filling, ef 70-75%, mild to moderate AS with mild LV subaortic gradient on 3/2018 echo, F/U Echo 05/2020 showed EF 75-80% Hyperdynamic with Pseudomonal Diastology, Moderate- Severe A/S ( P=65.4 / M= 45.9 DI = 0.44) small right ICA aneurysm on 3/2018 MRI/MRA head, HTN, HLD, PRINCESS, ITP, YULY compliant with CPAP, GERD, DM2, GERD here for follow up for CHF / Valvular Disease.      She is seen in the presence of her Daughter, Dea, and grants permission to discuss her medical care with daughter present.      He has not been seen in roughly 15 months.     She returns today because of worsening shortness of breath. She states that she has been compliant with all medications, but does note \"I drink an awful lot of water\". She states that she has had increased weight gain, shortness of breath, dyspnea on exertion, orthopnea and paroxysmal nocturnal dyspnea. She is compliant with CPAP. She was seen by her primary care provider who had increased her torsemide, but does not seem to have made much difference for her. She notes some con    Today ( 1/19/2023) she returns for follow up. She feels much improved. She has been exercising more    2/20/20204 --she returns for follow-up after another prolonged period of absence.  She was supposed to have followed up in weeks after her last encounter. She is seen with her Daughter Lawanda. Permission is granted to discuss medical care in her presence.  She completed  an echocardiogram for further assessment of her known Aortic Stenosis. She has  decreasesed her Vaping habit and is off nicotine.  She notes that about 2-3 days ago, she had some increased shortness of breath.  She had not taken her \"Water Pills and she woke up short of breath\" " "since resuming a lot of pills, she has overall felt better.    Patient denies chest pain and angina. Pt reports shortness of breath, dyspnea on exertion, orthopnea, and paroxysmal nocturnal dyspnea overall improving with reinstitution of diuretics. Pt reports worsening lower extremity edema. Reports increased abdominal girth. Pt denies palpitations or syncope. No recent falls. No fever or chills. No cough. No change in bowel or bladder habits. No travel. No sick contacts. No recent travel     12 point review of systems was performed and is otherwise negative.  Past medical history: As above.  Medications: Reviewed.  Allergies: Reviewed.  Social history: Patient denies smoking, alcohol abuse, or illicit drug use. She reports \"Vaping\" on e pod daily She has decreased from 5 ooo \"Puffs per week\" to 750 \"Puffs per week\" . She has quit for approximately 1 week   Family history: No sudden cardiac death or premature coronary artery disease..     Last Recorded Vitals:  Vitals:    02/20/24 1209   BP: 114/71   Patient Position: Sitting   Pulse: 74   SpO2: 100%   Weight: 122 kg (269 lb)   Height: 1.549 m (5' 1\")       Past Medical History:  She has a past medical history of Bipolar disorder, currently in remission, most recent episode unspecified (CMS/[facility]) (07/30/2021), Cellulitis of right lower limb (01/22/2018), Other chest pain (10/27/2017), Other chronic allergic conjunctivitis (06/01/2018), Other hypertrophic disorders of the skin (05/10/2017), Other muscle spasm (04/22/2014), Pain in left knee (03/07/2017), Pain in right leg (07/25/2018), Personal history of diseases of the blood and blood-forming organs and certain disorders involving the immune mechanism, Personal history of diseases of the skin and subcutaneous tissue (05/05/2020), Personal history of other benign neoplasm (05/10/2017), Personal history of other diseases of the circulatory system (05/06/2020), Personal history of other diseases of the circulatory " system, Personal history of other diseases of the circulatory system (02/15/2022), Personal history of other diseases of the digestive system (2021), Personal history of other diseases of the digestive system (2021), Personal history of other diseases of the digestive system (2017), Personal history of other diseases of the nervous system and sense organs, Personal history of other diseases of the nervous system and sense organs (2016), Personal history of other diseases of the nervous system and sense organs (10/09/2020), Personal history of other diseases of the respiratory system (2017), Personal history of other diseases of the respiratory system (2017), Personal history of other diseases of the respiratory system (2017), Personal history of other diseases of the respiratory system, Personal history of other infectious and parasitic diseases (2020), Personal history of other infectious and parasitic diseases (2014), Personal history of other medical treatment (2019), Personal history of other mental and behavioral disorders (2017), Personal history of other mental and behavioral disorders (2013), Personal history of other specified conditions (2014), Personal history of other specified conditions (2018), Personal history of other specified conditions (10/27/2017), Rash and other nonspecific skin eruption (05/10/2017), Unspecified diastolic (congestive) heart failure (CMS/HCC) (10/18/2021), Unspecified mastoiditis, left ear (2016), and Xerosis cutis (2017).    Past Surgical History:  She has a past surgical history that includes  section, classic (2013); Other surgical history (2022); Other surgical history (2022); Other surgical history (2022); MR angio head wo IV contrast (3/27/2018); CT angio head w and wo IV contrast (2023); and MR angio head wo IV contrast (2023).       Social History:  She reports that she has quit smoking. Her smoking use included cigarettes. She has been exposed to tobacco smoke. She uses smokeless tobacco. She reports current alcohol use. She reports that she does not use drugs.    Family History:  Family History   Problem Relation Name Age of Onset    Coronary artery disease Mother      Sudden death Mother      Hypertension Mother      Hypertension Father      Hypertension Sister      Diabetes type II Sister      Diabetes type II Mother's Sister      Diabetes type II Maternal Grandmother      Colon cancer Maternal Grandfather          Allergies:  Lisinopril, Tree nuts, and Penicillins    Outpatient Medications:  Current Outpatient Medications   Medication Instructions    albuterol 90 mcg/actuation inhaler INHALE TWO (2) PUFFS BY MOUTH EVERY 4-6 HOURS AS NEEDED FOR WHEEZING    atorvastatin (LIPITOR) 10 mg, oral, Nightly    azelastine (Astelin) 137 mcg (0.1 %) nasal spray 2 sprays, Each Nostril, 2 times daily    blood-glucose meter (OneTouch Verio Flex meter) misc USE TO TEST BLOOD SUGAR DAILY    bumetanide (BUMEX) 2 mg, oral, 2 times daily    busPIRone (BUSPAR) 30 mg, oral, 2 times daily    ergocalciferol (VITAMIN D-2) 50,000 Units, oral, Weekly    fluticasone (Flonase) 50 mcg/actuation nasal spray 2 sprays, Each Nostril, Daily    gabapentin (NEURONTIN) 100 mg, oral, 4 times daily    hydrOXYzine HCL (ATARAX) 50 mg, oral, 3 times daily    lactulose 20 gram/30 mL oral solution GIVE 30 ML EVERY 2 HOURS UNTIL SHE HAS A BOWEL MOVEMENT--THEN TWICE A DAY FOR MAINTENANCE    lamoTRIgine (LaMICtal) 100 mg tablet 1 tablet, oral, Daily    loratadine (CLARITIN) 10 mg, oral, Daily    losartan (COZAAR) 100 mg, oral, Daily    lurasidone (LATUDA) 60 mg, oral    magnesium oxide (MAG-OX) 800 mg, oral, 2 times daily    metFORMIN XR (GLUCOPHAGE-XR) 2,000 mg, oral, Daily RT    montelukast (SINGULAIR) 10 mg, oral, Daily    omeprazole (PRILOSEC) 40 mg, oral, Daily    potassium  chloride CR 20 mEq ER tablet 40 mEq, oral, Daily    semaglutide (OZEMPIC) 1 mg, subcutaneous, Weekly    topiramate (Topamax) 50 mg tablet TAKE 1/2 TABLET BY MOUTH IN THE MORNING AND 1 TABLET AT NIGHT FOR 1-2 WEEKS THEN INCREASE TO 1 TABLET TWICE DAILY    verapamil SR (CALAN-SR) 240 mg, oral, Daily       Physical Exam:  PHYSICAL EXAMINATION    GENERAL APPEARANCE: Well developed, well nourished, in no acute distress.  VITAL SIGNS: reviewed and confirmed.   SKIN: Inspection of the skin reveals no rashes, ulcerations or petechiae.  HEENT: The sclerae were anicteric  or masses.   NECK: Supple and symmetric. There was no thyroid enlargement, and no tenderness, or masses were felt.  CHEST: Normal AP diameter and normal contour without any kyphoscoliosis.  LUNGS: Auscultation of the lungs revealed normal breath sounds without any other adventitious sounds or rubs.  CARDIOVASCULAR: There was a regular rate and rhythm with a 3/6 systolic ejection murmur that radiates to the base of the neck.. The carotid pulses were normal and 2+ bilaterally without bruits. Peripheral pulses were 2+ and symmetric.  ABDOMEN: Soft and nontender with normal bowel sounds.  LYMPH NODES: No lymphadenopathy was appreciated in the neck  MUSCULOSKELETAL: Gait was normal. There was no tenderness or effusions noted. Muscle strength and tone were normal.  EXTREMITIES: No cyanosis, clubbing or edema.  NEUROLOGIC: Alert and oriented x 3. Normal affect. Gait was normal. Normal deep tendon reflexes with no pathological reflexes. Sensation to touch was normal.       Last Labs:  CBC -  Lab Results   Component Value Date    WBC 9.2 04/27/2023    HGB 13.1 04/27/2023    HCT 41.8 04/27/2023    MCV 85 04/27/2023     04/27/2023       CMP -  Lab Results   Component Value Date    CALCIUM 9.3 04/27/2023    PHOS 3.6 03/27/2018    PROT 7.1 04/27/2023    ALBUMIN 4.1 04/27/2023    AST 25 04/27/2023    ALT 31 04/27/2023    ALKPHOS 71 04/27/2023    BILITOT 0.3  04/27/2023       LIPID PANEL -   Lab Results   Component Value Date    CHOL 162 04/27/2023    TRIG 287 (H) 04/27/2023    HDL 44.5 04/27/2023    CHHDL 3.6 04/27/2023    LDLF 60 04/27/2023    VLDL 57 (H) 04/27/2023    NHDL 118 04/27/2023       RENAL FUNCTION PANEL -   Lab Results   Component Value Date    GLUCOSE 177 (H) 04/27/2023     (L) 04/27/2023    K 3.7 04/27/2023     04/27/2023    CO2 20 (L) 04/27/2023    ANIONGAP 16 04/27/2023    BUN 14 04/27/2023    CREATININE 0.76 04/27/2023    CALCIUM 9.3 04/27/2023    PHOS 3.6 03/27/2018    ALBUMIN 4.1 04/27/2023        Lab Results   Component Value Date    BNP 37 12/15/2022    HGBA1C 6.2 (A) 04/27/2023       Last Cardiology Tests:  ECG:  In Office EKG  02/20/2024 --       Echo:/  Trans/thoracic Echo (TTE) Complete 02/12/2024   1. Left ventricular systolic function is normal with a 65% estimated ejection fraction.   2. Mild mitral valve regurgitation.   3. Severe aortic valve stenosis.   4. The aortic valve appears tricuspid with restriction.   5. There is moderate aortic valve cusp calcification.   6. Mild to moderate aortic valve regurgitation.   7. The peak instantaneous gradient of the aortic valve is 81.3 mmHg.    Echocardiogram 01/2023  1. Left ventricular systolic function is normal with a 55-60% estimated ejection fraction.   2. Spectral Doppler shows a pseudonormal pattern of left ventricular diastolic filling.   3. No left ventricular thrombus visualized.   4. There is no evidence of cardiac tamponade.   5. Mildly elevated RVSP.   6. Aortic valve appears abnormal.   7. Moderate to severe aortic valve stenosis.   8. Moderate aortic valve regurgitation.    Ejection Fractions:  EF   Date/Time Value Ref Range Status   02/12/2024 09:52 AM 81 %        Cath:  No results found for this or any previous visit from the past 1095 days.      Stress Test:  No results found for this or any previous visit from the past 1095 days.      Cardiac Imaging:  No results  found for this or any previous visit from the past 1095 days.        Lab review: I have personally reviewed the laboratory result(s) =  Diagnostic review: I have personally reviewed the result(s) of the EKG and Echocardiogram .   Imaging review: I have  personally reviewed the result(s)     Assessment/Plan     Problem List Items Addressed This Visit       Severe aortic stenosis by prior echocardiogram - Primary (Chronic)    Overview     Severe aortic stenosis by echocardiography  Echocardiogram 2/2024  -- Peak 81.3 / Mean 47.0 / DI 0.42, YOAN by VTI 1.22  -- Aortic insufficiency pressure half-time 419    Echocardiogram 1/2023  -- Peak 79.7/mean 46.0/DI 0.41, YOAN by VTI 1.07  -- Aortic insufficiency pressure half-time 436    Echocardiogram 11/2021  -- Peak 57.1/mean 27.2/DI 0.37, YOAN by VTI 1.01               Current Assessment & Plan     She remains asymptomatic.  There has not been decline in her ejection fraction.  There has not been a decline in her exercise tolerance although this is somewhat limited.  --Continue to follow with serial EKGs every 6 months at this time  --I have encouraged her to increase her diet, exercise intervals in anticipation of expected surgery as well as to gauge for any anginal symptoms related to severe aortic stenosis         Relevant Orders    ECG 12 lead (Clinic Performed) (Completed)    Transthoracic echo (TTE) complete    Class 3 severe obesity due to excess calories with serious comorbidity and body mass index (BMI) of 50.0 to 59.9 in adult (CMS/[facility])    Relevant Orders    ECG 12 lead (Clinic Performed) (Completed)    Transthoracic echo (TTE) complete    Hyperlipidemia    Overview     The 10-year ASCVD risk score (Shan HA, et al., 2019) is: 1.8%    Values used to calculate the score:      Age: 48 years      Sex: Female      Is Non- : No      Diabetic: Yes      Tobacco smoker: No      Systolic Blood Pressure: 114 mmHg      Is BP treated: Yes      HDL  "Cholesterol: 44.5 mg/dL      Total Cholesterol: 162 mg/dL    Lab Results   Component Value Date    CHOL 162 04/27/2023    CHOL 155 07/30/2021    CHOL 165 02/26/2020     Lab Results   Component Value Date    HDL 44.5 04/27/2023    HDL 46.2 07/30/2021    HDL 46.5 02/26/2020   No results found for: \"LDLCALC\"  Lab Results   Component Value Date    TRIG 287 (H) 04/27/2023    TRIG 122 07/30/2021    TRIG 127 02/26/2020   No components found for: \"CHOLHDL\"           Current Assessment & Plan     Currently maintained on atorvastatin         Hypertension         Demario Landa DO  "

## 2024-02-21 ENCOUNTER — APPOINTMENT (OUTPATIENT)
Dept: SURGERY | Facility: CLINIC | Age: 49
End: 2024-02-21
Payer: COMMERCIAL

## 2024-02-21 NOTE — ASSESSMENT & PLAN NOTE
She remains asymptomatic.  There has not been decline in her ejection fraction.  There has not been a decline in her exercise tolerance although this is somewhat limited.  --Continue to follow with serial EKGs every 6 months at this time  --I have encouraged her to increase her diet, exercise intervals in anticipation of expected surgery as well as to gauge for any anginal symptoms related to severe aortic stenosis

## 2024-02-28 PROBLEM — E66.01 MORBID OBESITY (MULTI): Status: ACTIVE | Noted: 2024-02-28

## 2024-02-28 PROBLEM — Z01.818 PRE-OPERATIVE CLEARANCE: Status: ACTIVE | Noted: 2024-02-28

## 2024-02-28 PROBLEM — Z98.84 BARIATRIC SURGERY STATUS: Status: ACTIVE | Noted: 2024-02-28

## 2024-02-28 PROBLEM — Z13.21 ENCOUNTER FOR VITAMIN DEFICIENCY SCREENING: Status: ACTIVE | Noted: 2024-02-28

## 2024-02-29 ENCOUNTER — APPOINTMENT (OUTPATIENT)
Dept: CARDIOLOGY | Facility: CLINIC | Age: 49
End: 2024-02-29
Payer: COMMERCIAL

## 2024-03-01 ENCOUNTER — TELEPHONE (OUTPATIENT)
Dept: SURGERY | Facility: CLINIC | Age: 49
End: 2024-03-01
Payer: COMMERCIAL

## 2024-03-01 DIAGNOSIS — J30.89 ALLERGIC RHINITIS DUE TO OTHER ALLERGIC TRIGGER, UNSPECIFIED SEASONALITY: ICD-10-CM

## 2024-03-01 DIAGNOSIS — I51.89 DIASTOLIC DYSFUNCTION: ICD-10-CM

## 2024-03-01 DIAGNOSIS — G43.909 MIGRAINE WITHOUT STATUS MIGRAINOSUS, NOT INTRACTABLE, UNSPECIFIED MIGRAINE TYPE: ICD-10-CM

## 2024-03-04 DIAGNOSIS — E11.9 TYPE 2 DIABETES MELLITUS WITHOUT COMPLICATION, WITHOUT LONG-TERM CURRENT USE OF INSULIN (MULTI): ICD-10-CM

## 2024-03-04 RX ORDER — POTASSIUM CHLORIDE 20 MEQ/1
40 TABLET, EXTENDED RELEASE ORAL DAILY
Qty: 60 TABLET | Refills: 3 | Status: SHIPPED | OUTPATIENT
Start: 2024-03-04

## 2024-03-04 RX ORDER — FLUTICASONE PROPIONATE 50 MCG
2 SPRAY, SUSPENSION (ML) NASAL DAILY
Qty: 16 G | Refills: 3 | Status: SHIPPED | OUTPATIENT
Start: 2024-03-04

## 2024-03-05 ENCOUNTER — TELEPHONE (OUTPATIENT)
Dept: SURGERY | Facility: CLINIC | Age: 49
End: 2024-03-05

## 2024-03-05 RX ORDER — TOPIRAMATE 50 MG/1
TABLET, FILM COATED ORAL
Qty: 180 TABLET | Refills: 1 | Status: SHIPPED | OUTPATIENT
Start: 2024-03-05

## 2024-03-05 NOTE — TELEPHONE ENCOUNTER
Elkin Camilo, I just left you a second message to confirm your scheduled visit with Dr. Abraham on 3/6/24 2:00 PM at [facility] (inside [facility], main floor in the Specialty Clinic).  Please ARRIVE AT:  11:45 AM for the required Noon to 1PM New Patient Class led by Dr. Arbaham and Joycelyn RN Coordinator.  We look forward to seeing you, Melany Hall, [facility] Clinic Nurse.

## 2024-03-06 RX ORDER — METFORMIN HYDROCHLORIDE 500 MG/1
2000 TABLET, EXTENDED RELEASE ORAL
Qty: 120 TABLET | Refills: 1 | Status: SHIPPED | OUTPATIENT
Start: 2024-03-06 | End: 2024-05-01

## 2024-04-01 DIAGNOSIS — J30.89 ALLERGIC RHINITIS DUE TO OTHER ALLERGIC TRIGGER, UNSPECIFIED SEASONALITY: ICD-10-CM

## 2024-04-02 RX ORDER — LORATADINE 10 MG/1
10 TABLET ORAL DAILY
Qty: 30 TABLET | Refills: 10 | Status: SHIPPED | OUTPATIENT
Start: 2024-04-02

## 2024-04-18 ENCOUNTER — APPOINTMENT (OUTPATIENT)
Dept: PRIMARY CARE | Facility: CLINIC | Age: 49
End: 2024-04-18
Payer: COMMERCIAL

## 2024-04-30 DIAGNOSIS — E11.9 TYPE 2 DIABETES MELLITUS WITHOUT COMPLICATION, WITHOUT LONG-TERM CURRENT USE OF INSULIN (MULTI): ICD-10-CM

## 2024-04-30 DIAGNOSIS — I51.89 DIASTOLIC DYSFUNCTION: ICD-10-CM

## 2024-05-01 RX ORDER — BUMETANIDE 2 MG/1
2 TABLET ORAL 2 TIMES DAILY
Qty: 60 TABLET | Refills: 1 | Status: SHIPPED | OUTPATIENT
Start: 2024-05-01

## 2024-05-01 RX ORDER — METFORMIN HYDROCHLORIDE 500 MG/1
2000 TABLET, EXTENDED RELEASE ORAL DAILY
Qty: 120 TABLET | Refills: 1 | Status: SHIPPED | OUTPATIENT
Start: 2024-05-01

## 2024-05-13 DIAGNOSIS — E11.42 DIABETIC POLYNEUROPATHY ASSOCIATED WITH TYPE 2 DIABETES MELLITUS (MULTI): ICD-10-CM

## 2024-05-15 RX ORDER — GABAPENTIN 100 MG/1
100 CAPSULE ORAL 4 TIMES DAILY
Qty: 30 CAPSULE | Refills: 0 | Status: SHIPPED | OUTPATIENT
Start: 2024-05-15

## 2024-06-06 ENCOUNTER — APPOINTMENT (OUTPATIENT)
Dept: PRIMARY CARE | Facility: CLINIC | Age: 49
End: 2024-06-06
Payer: COMMERCIAL

## 2024-06-06 DIAGNOSIS — I10 HYPERTENSION, UNSPECIFIED TYPE: ICD-10-CM

## 2024-06-08 RX ORDER — LOSARTAN POTASSIUM 100 MG/1
100 TABLET ORAL DAILY
Qty: 30 TABLET | Refills: 0 | Status: SHIPPED | OUTPATIENT
Start: 2024-06-08

## 2024-06-11 ENCOUNTER — APPOINTMENT (OUTPATIENT)
Dept: PRIMARY CARE | Facility: CLINIC | Age: 49
End: 2024-06-11
Payer: COMMERCIAL

## 2024-06-26 ENCOUNTER — APPOINTMENT (OUTPATIENT)
Dept: NEUROLOGY | Facility: CLINIC | Age: 49
End: 2024-06-26
Payer: COMMERCIAL

## 2024-06-28 DIAGNOSIS — I51.89 DIASTOLIC DYSFUNCTION: ICD-10-CM

## 2024-06-28 DIAGNOSIS — E11.9 TYPE 2 DIABETES MELLITUS WITHOUT COMPLICATION, WITHOUT LONG-TERM CURRENT USE OF INSULIN (MULTI): ICD-10-CM

## 2024-06-28 DIAGNOSIS — J30.89 ALLERGIC RHINITIS DUE TO OTHER ALLERGIC TRIGGER, UNSPECIFIED SEASONALITY: ICD-10-CM

## 2024-06-30 RX ORDER — POTASSIUM CHLORIDE 20 MEQ/1
40 TABLET, EXTENDED RELEASE ORAL DAILY
Qty: 30 TABLET | Refills: 0 | Status: SHIPPED | OUTPATIENT
Start: 2024-06-30

## 2024-06-30 RX ORDER — METFORMIN HYDROCHLORIDE 500 MG/1
2000 TABLET, EXTENDED RELEASE ORAL DAILY
Qty: 60 TABLET | Refills: 0 | Status: SHIPPED | OUTPATIENT
Start: 2024-06-30

## 2024-06-30 RX ORDER — FLUTICASONE PROPIONATE 50 MCG
2 SPRAY, SUSPENSION (ML) NASAL DAILY
Qty: 16 G | Refills: 0 | Status: SHIPPED | OUTPATIENT
Start: 2024-06-30

## 2024-07-06 DIAGNOSIS — I10 HYPERTENSION, UNSPECIFIED TYPE: ICD-10-CM

## 2024-07-09 RX ORDER — LOSARTAN POTASSIUM 100 MG/1
100 TABLET ORAL DAILY
Qty: 10 TABLET | Refills: 0 | Status: SHIPPED | OUTPATIENT
Start: 2024-07-09

## 2024-07-10 DIAGNOSIS — E11.42 DIABETIC POLYNEUROPATHY ASSOCIATED WITH TYPE 2 DIABETES MELLITUS (MULTI): ICD-10-CM

## 2024-07-10 DIAGNOSIS — I51.89 DIASTOLIC DYSFUNCTION: ICD-10-CM

## 2024-07-11 RX ORDER — GABAPENTIN 100 MG/1
CAPSULE ORAL
Qty: 30 CAPSULE | Refills: 10 | OUTPATIENT
Start: 2024-07-11

## 2024-07-12 RX ORDER — BUMETANIDE 2 MG/1
2 TABLET ORAL 2 TIMES DAILY
Qty: 14 TABLET | Refills: 0 | Status: SHIPPED | OUTPATIENT
Start: 2024-07-12

## 2024-07-17 ENCOUNTER — OFFICE VISIT (OUTPATIENT)
Dept: PRIMARY CARE | Facility: CLINIC | Age: 49
End: 2024-07-17
Payer: COMMERCIAL

## 2024-07-17 VITALS
HEIGHT: 61 IN | SYSTOLIC BLOOD PRESSURE: 124 MMHG | HEART RATE: 73 BPM | BODY MASS INDEX: 51.92 KG/M2 | TEMPERATURE: 98.4 F | DIASTOLIC BLOOD PRESSURE: 74 MMHG | WEIGHT: 275 LBS | OXYGEN SATURATION: 98 %

## 2024-07-17 DIAGNOSIS — E83.42 HYPOMAGNESEMIA: ICD-10-CM

## 2024-07-17 DIAGNOSIS — J01.00 ACUTE NON-RECURRENT MAXILLARY SINUSITIS: ICD-10-CM

## 2024-07-17 DIAGNOSIS — E55.9 VITAMIN D DEFICIENCY: ICD-10-CM

## 2024-07-17 DIAGNOSIS — I10 PRIMARY HYPERTENSION: ICD-10-CM

## 2024-07-17 DIAGNOSIS — J30.89 ALLERGIC RHINITIS DUE TO OTHER ALLERGIC TRIGGER, UNSPECIFIED SEASONALITY: ICD-10-CM

## 2024-07-17 DIAGNOSIS — K76.0 FATTY LIVER: ICD-10-CM

## 2024-07-17 DIAGNOSIS — E78.2 MIXED HYPERLIPIDEMIA: Primary | ICD-10-CM

## 2024-07-17 DIAGNOSIS — E11.42 DIABETIC POLYNEUROPATHY ASSOCIATED WITH TYPE 2 DIABETES MELLITUS (MULTI): ICD-10-CM

## 2024-07-17 DIAGNOSIS — D69.3 IDIOPATHIC THROMBOCYTOPENIC PURPURA (MULTI): ICD-10-CM

## 2024-07-17 DIAGNOSIS — N92.6 IRREGULAR MENSTRUAL CYCLE: ICD-10-CM

## 2024-07-17 DIAGNOSIS — J45.20 MILD INTERMITTENT ASTHMA WITHOUT COMPLICATION (HHS-HCC): ICD-10-CM

## 2024-07-17 DIAGNOSIS — G43.909 MIGRAINE WITHOUT STATUS MIGRAINOSUS, NOT INTRACTABLE, UNSPECIFIED MIGRAINE TYPE: ICD-10-CM

## 2024-07-17 DIAGNOSIS — E11.9 TYPE 2 DIABETES MELLITUS WITHOUT COMPLICATION, WITHOUT LONG-TERM CURRENT USE OF INSULIN (MULTI): ICD-10-CM

## 2024-07-17 DIAGNOSIS — E66.01 CLASS 3 SEVERE OBESITY DUE TO EXCESS CALORIES WITH SERIOUS COMORBIDITY AND BODY MASS INDEX (BMI) OF 50.0 TO 59.9 IN ADULT (MULTI): ICD-10-CM

## 2024-07-17 DIAGNOSIS — I35.0 SEVERE AORTIC STENOSIS BY PRIOR ECHOCARDIOGRAM: Chronic | ICD-10-CM

## 2024-07-17 DIAGNOSIS — I67.1 ANEURYSM, CAROTID ARTERY, INTERNAL (HHS-HCC): ICD-10-CM

## 2024-07-17 DIAGNOSIS — G47.33 OBSTRUCTIVE SLEEP APNEA: ICD-10-CM

## 2024-07-17 DIAGNOSIS — E87.6 HYPOKALEMIA: ICD-10-CM

## 2024-07-17 PROBLEM — Z13.21 ENCOUNTER FOR VITAMIN DEFICIENCY SCREENING: Status: RESOLVED | Noted: 2024-02-28 | Resolved: 2024-07-17

## 2024-07-17 PROBLEM — Z01.818 PRE-OPERATIVE CLEARANCE: Status: RESOLVED | Noted: 2024-02-28 | Resolved: 2024-07-17

## 2024-07-17 PROCEDURE — 4010F ACE/ARB THERAPY RXD/TAKEN: CPT | Performed by: PEDIATRICS

## 2024-07-17 PROCEDURE — 83036 HEMOGLOBIN GLYCOSYLATED A1C: CPT | Performed by: PEDIATRICS

## 2024-07-17 PROCEDURE — 3078F DIAST BP <80 MM HG: CPT | Performed by: PEDIATRICS

## 2024-07-17 PROCEDURE — 3008F BODY MASS INDEX DOCD: CPT | Performed by: PEDIATRICS

## 2024-07-17 PROCEDURE — G2211 COMPLEX E/M VISIT ADD ON: HCPCS | Performed by: PEDIATRICS

## 2024-07-17 PROCEDURE — 3074F SYST BP LT 130 MM HG: CPT | Performed by: PEDIATRICS

## 2024-07-17 PROCEDURE — RXMED WILLOW AMBULATORY MEDICATION CHARGE

## 2024-07-17 PROCEDURE — 99214 OFFICE O/P EST MOD 30 MIN: CPT | Performed by: PEDIATRICS

## 2024-07-17 RX ORDER — DOXYCYCLINE 100 MG/1
100 CAPSULE ORAL 2 TIMES DAILY
Qty: 20 CAPSULE | Refills: 0 | Status: SHIPPED | OUTPATIENT
Start: 2024-07-17 | End: 2024-07-28

## 2024-07-17 ASSESSMENT — ENCOUNTER SYMPTOMS
SORE THROAT: 0
DIARRHEA: 0
CONSTIPATION: 0
CHILLS: 0
ARTHRALGIAS: 0
DIZZINESS: 0
SINUS PAIN: 1
RHINORRHEA: 1
HEADACHES: 1
SINUS PRESSURE: 1
FEVER: 0

## 2024-07-17 NOTE — PROGRESS NOTES
"Subjective   Patient ID: Leslee Rushing is a 49 y.o. female who presents for sinusitis    HPI     Here today for a sick visit.    Reports has had flu like symptoms for the past 2 weeks. Sneezing, congestion, sinus pressure, pressure in both ears, headaches.  Yellow/green discharge from nose.    No fever, chills, cough, SOB, CP, sore throat, body aches.  Used OTC sinus pain and pressure, allergy sinus with some relief initially before becoming worse.  Trying to stay hydrated      No smoking   Health maintenance:   Mammogram - Feb 2024  Cologuard- needs to do   Follows with cardiology  Follows with gen surg - for poss sleeve gastrectomy   Follows with neurosurgery--for aneurysm follow up  A1c: 6.2  Gets dilated eye exam through [facility] needs to see them this year  Review of Systems   Constitutional:  Negative for chills and fever.   HENT:  Positive for congestion, rhinorrhea, sinus pressure, sinus pain and sneezing. Negative for sore throat.    Gastrointestinal:  Negative for constipation and diarrhea.   Musculoskeletal:  Negative for arthralgias.   Neurological:  Positive for headaches. Negative for dizziness.         Objective   /74   Pulse 73   Temp 36.9 °C (98.4 °F)   Ht 1.549 m (5' 1\")   Wt 125 kg (275 lb)   SpO2 98%   BMI 51.96 kg/m²     Physical Exam  Vitals reviewed.   Constitutional:       General: She is not in acute distress.  HENT:      Head: Normocephalic.      Right Ear: Tympanic membrane normal.      Left Ear: Tympanic membrane normal.      Nose: Nose normal.      Mouth/Throat:      Pharynx: Oropharynx is clear.   Cardiovascular:      Rate and Rhythm: Normal rate and regular rhythm.      Heart sounds: Normal heart sounds.   Pulmonary:      Breath sounds: Normal breath sounds.   Abdominal:      Palpations: Abdomen is soft.      Tenderness: There is no abdominal tenderness.   Musculoskeletal:         General: No tenderness.   Skin:     Findings: No rash.      Comments: Sensation " "intact in feet; good pulses   Neurological:      General: No focal deficit present.      Mental Status: She is alert.   Psychiatric:         Mood and Affect: Mood normal.         Assessment/Plan   Problem List Items Addressed This Visit       Allergic rhinitis    Current Assessment & Plan     Takes Azelastine, Flonase and Claritin; not controlling symptoms well.         Diabetes mellitus (Multi)    Current Assessment & Plan     Sugars run 90 to 102         Relevant Orders    Hemoglobin A1C    Diabetic neuropathy (Multi)    Hyperlipidemia - Primary    Overview     The 10-year ASCVD risk score (Shan HA, et al., 2019) is: 1.8%    Values used to calculate the score:      Age: 48 years      Sex: Female      Is Non- : No      Diabetic: Yes      Tobacco smoker: No      Systolic Blood Pressure: 114 mmHg      Is BP treated: Yes      HDL Cholesterol: 44.5 mg/dL      Total Cholesterol: 162 mg/dL    Lab Results   Component Value Date    CHOL 162 04/27/2023    CHOL 155 07/30/2021    CHOL 165 02/26/2020     Lab Results   Component Value Date    HDL 44.5 04/27/2023    HDL 46.2 07/30/2021    HDL 46.5 02/26/2020     No results found for: \"LDLCALC\"  Lab Results   Component Value Date    TRIG 287 (H) 04/27/2023    TRIG 122 07/30/2021    TRIG 127 02/26/2020     No components found for: \"CHOLHDL\"           Current Assessment & Plan     Cholesterol 182 in January         Idiopathic thrombocytopenic purpura (Multi)    Current Assessment & Plan     CBC ok in January         Migraine headache    Current Assessment & Plan     Gets migraine 5 times a month but each one lasts 2 to 3 days; Dr Liz told her to stop excedrin migraine due to rebound. On Topamax; Has tried sumatriptan but raises BP;          Relevant Medications    rimegepant (Nurtec ODT) 75 mg tablet,disintegrating    Hypertension    Current Assessment & Plan     Well controlled         Class 3 severe obesity due to excess calories with serious " comorbidity and body mass index (BMI) of 50.0 to 59.9 in adult (Multi)    Current Assessment & Plan     Drinks water;  Lunch--gas station lunch--ham and cheese or turkey and cheese;   Dinner--spaghetti, chicken with stuffing, steak or pork chops a few times a month; grilled cheese, eggs, Romansh toast, eats veggies  Snacks--fruit, Lay's chips--one bag a week;         Obstructive sleep apnea    Current Assessment & Plan     Uses CPAP           Mild intermittent asthma without complication ([facility])    Current Assessment & Plan     Doing well; has not needed abuterol for a while         Aneurysm, carotid artery, internal ([facility])    Current Assessment & Plan     Has a neurologist but not a vascular specialist         Relevant Orders    Referral to Vascular Surgery    Hypokalemia    Current Assessment & Plan     Potassium normal in January         Hypomagnesemia    Current Assessment & Plan     Magnesium normal this year         Irregular menstrual cycle    Current Assessment & Plan     Has period once or twice a year         Severe aortic stenosis by prior echocardiogram (Chronic)    Overview     Severe aortic stenosis by echocardiography  Echocardiogram 2/2024  -- Peak 81.3 / Mean 47.0 / DI 0.42, YOAN by VTI 1.22  -- Aortic insufficiency pressure half-time 419    Echocardiogram 1/2023  -- Peak 79.7/mean 46.0/DI 0.41, YOAN by VTI 1.07  -- Aortic insufficiency pressure half-time 436    Echocardiogram 11/2021  -- Peak 57.1/mean 27.2/DI 0.37, YOAN by VTI 1.01               Current Assessment & Plan     Anticipates surgery in the next 2 to 5 years; no shortness of breath, chest pain or syncope; will have another Echo August 23         Vitamin D deficiency    Fatty liver    Current Assessment & Plan     Needs fibroscan; rarely alcohol          Other Visit Diagnoses       Acute non-recurrent maxillary sinusitis        Relevant Medications    doxycycline (Vibramycin) 100 mg capsule

## 2024-07-17 NOTE — ASSESSMENT & PLAN NOTE
Gets migraine 5 times a month but each one lasts 2 to 3 days; Dr Liz told her to stop excedrin migraine due to rebound. On Topamax; Has tried sumatriptan but raises BP;

## 2024-07-17 NOTE — PATIENT INSTRUCTIONS
Don't buy chips, egg yolks, cheese;  See bariatric clinic  See Dr Fuentes about carotid aneurysm  See GYN and eye doctor.  Return the COLOGUARD  Return in 6 months

## 2024-07-17 NOTE — ASSESSMENT & PLAN NOTE
Anticipates surgery in the next 2 to 5 years; no shortness of breath, chest pain or syncope; will have another Echo August 23

## 2024-07-17 NOTE — ASSESSMENT & PLAN NOTE
Drinks water;  Lunch--gas station lunch--ham and cheese or turkey and cheese;   Dinner--spaghetti, chicken with stuffing, steak or pork chops a few times a month; grilled cheese, eggs, Kazakh toast, eats veggies  Snacks--fruit, Lay's chips--one bag a week;

## 2024-07-18 ENCOUNTER — PHARMACY VISIT (OUTPATIENT)
Dept: PHARMACY | Facility: CLINIC | Age: 49
End: 2024-07-18
Payer: COMMERCIAL

## 2024-07-30 DIAGNOSIS — I51.89 DIASTOLIC DYSFUNCTION: ICD-10-CM

## 2024-07-30 DIAGNOSIS — I10 HYPERTENSION, UNSPECIFIED TYPE: ICD-10-CM

## 2024-07-30 DIAGNOSIS — E11.9 TYPE 2 DIABETES MELLITUS WITHOUT COMPLICATION, WITHOUT LONG-TERM CURRENT USE OF INSULIN (MULTI): ICD-10-CM

## 2024-07-30 DIAGNOSIS — J30.89 ALLERGIC RHINITIS DUE TO OTHER ALLERGIC TRIGGER, UNSPECIFIED SEASONALITY: ICD-10-CM

## 2024-08-01 ENCOUNTER — APPOINTMENT (OUTPATIENT)
Dept: PRIMARY CARE | Facility: CLINIC | Age: 49
End: 2024-08-01
Payer: COMMERCIAL

## 2024-08-01 RX ORDER — POTASSIUM CHLORIDE 20 MEQ/1
40 TABLET, EXTENDED RELEASE ORAL DAILY
Qty: 180 TABLET | Refills: 1 | Status: SHIPPED | OUTPATIENT
Start: 2024-08-01

## 2024-08-01 RX ORDER — METFORMIN HYDROCHLORIDE 500 MG/1
2000 TABLET, EXTENDED RELEASE ORAL DAILY
Qty: 360 TABLET | Refills: 1 | Status: SHIPPED | OUTPATIENT
Start: 2024-08-01

## 2024-08-01 RX ORDER — FLUTICASONE PROPIONATE 50 MCG
2 SPRAY, SUSPENSION (ML) NASAL DAILY
Qty: 16 G | Refills: 10 | Status: SHIPPED | OUTPATIENT
Start: 2024-08-01

## 2024-08-01 RX ORDER — BUMETANIDE 2 MG/1
TABLET ORAL
Qty: 180 TABLET | Refills: 1 | Status: SHIPPED | OUTPATIENT
Start: 2024-08-01

## 2024-08-01 RX ORDER — LOSARTAN POTASSIUM 100 MG/1
100 TABLET ORAL DAILY
Qty: 90 TABLET | Refills: 1 | Status: SHIPPED | OUTPATIENT
Start: 2024-08-01

## 2024-08-07 ENCOUNTER — TELEPHONE (OUTPATIENT)
Dept: PRIMARY CARE | Facility: CLINIC | Age: 49
End: 2024-08-07
Payer: COMMERCIAL

## 2024-08-07 NOTE — TELEPHONE ENCOUNTER
Dr. Sabillon, Pt. left a voicemail message stating you denied her prescription for gabapentin and Pt. would like to know why . Pt. Said to call her back this evening at [phone].

## 2024-08-08 DIAGNOSIS — E11.42 DIABETIC POLYNEUROPATHY ASSOCIATED WITH TYPE 2 DIABETES MELLITUS (MULTI): ICD-10-CM

## 2024-08-08 RX ORDER — GABAPENTIN 100 MG/1
100 CAPSULE ORAL 4 TIMES DAILY
Qty: 120 CAPSULE | Refills: 3 | Status: SHIPPED | OUTPATIENT
Start: 2024-08-08

## 2024-08-19 DIAGNOSIS — E11.42 DIABETIC POLYNEUROPATHY ASSOCIATED WITH TYPE 2 DIABETES MELLITUS (MULTI): ICD-10-CM

## 2024-08-19 DIAGNOSIS — G43.909 MIGRAINE WITHOUT STATUS MIGRAINOSUS, NOT INTRACTABLE, UNSPECIFIED MIGRAINE TYPE: ICD-10-CM

## 2024-08-20 RX ORDER — GABAPENTIN 100 MG/1
100 CAPSULE ORAL 4 TIMES DAILY
Qty: 120 CAPSULE | Refills: 3 | OUTPATIENT
Start: 2024-08-20

## 2024-08-20 RX ORDER — TOPIRAMATE 50 MG/1
TABLET, FILM COATED ORAL
Qty: 180 TABLET | Refills: 1 | OUTPATIENT
Start: 2024-08-20

## 2024-08-26 ENCOUNTER — OFFICE VISIT (OUTPATIENT)
Dept: PRIMARY CARE | Facility: CLINIC | Age: 49
End: 2024-08-26
Payer: COMMERCIAL

## 2024-08-26 VITALS
SYSTOLIC BLOOD PRESSURE: 129 MMHG | WEIGHT: 283 LBS | TEMPERATURE: 97.1 F | OXYGEN SATURATION: 98 % | HEART RATE: 72 BPM | BODY MASS INDEX: 53.47 KG/M2 | DIASTOLIC BLOOD PRESSURE: 74 MMHG

## 2024-08-26 DIAGNOSIS — G43.909 MIGRAINE WITHOUT STATUS MIGRAINOSUS, NOT INTRACTABLE, UNSPECIFIED MIGRAINE TYPE: ICD-10-CM

## 2024-08-26 DIAGNOSIS — E11.9 TYPE 2 DIABETES MELLITUS WITHOUT COMPLICATION, WITHOUT LONG-TERM CURRENT USE OF INSULIN (MULTI): Primary | ICD-10-CM

## 2024-08-26 LAB — POC FINGERSTICK BLOOD GLUCOSE: 120 MG/DL (ref 70–100)

## 2024-08-26 PROCEDURE — 3074F SYST BP LT 130 MM HG: CPT | Performed by: PEDIATRICS

## 2024-08-26 PROCEDURE — 3078F DIAST BP <80 MM HG: CPT | Performed by: PEDIATRICS

## 2024-08-26 PROCEDURE — G2211 COMPLEX E/M VISIT ADD ON: HCPCS | Performed by: PEDIATRICS

## 2024-08-26 PROCEDURE — 99213 OFFICE O/P EST LOW 20 MIN: CPT | Performed by: PEDIATRICS

## 2024-08-26 PROCEDURE — 4010F ACE/ARB THERAPY RXD/TAKEN: CPT | Performed by: PEDIATRICS

## 2024-08-26 PROCEDURE — 82962 GLUCOSE BLOOD TEST: CPT | Performed by: PEDIATRICS

## 2024-08-26 RX ORDER — MECLIZINE HYDROCHLORIDE 25 MG/1
25 TABLET ORAL 3 TIMES DAILY PRN
Qty: 30 TABLET | Refills: 11 | Status: SHIPPED | OUTPATIENT
Start: 2024-08-26 | End: 2025-08-26

## 2024-08-26 RX ORDER — MECLIZINE HYDROCHLORIDE 25 MG/1
25 TABLET ORAL 3 TIMES DAILY PRN
Qty: 30 TABLET | Refills: 11 | Status: SHIPPED | OUTPATIENT
Start: 2024-08-26 | End: 2024-08-26 | Stop reason: SDUPTHER

## 2024-08-26 NOTE — PROGRESS NOTES
Subjective   Patient ID: Leslee Rushing is a 49 y.o. female who presents for dizziness and numbness. For the last week has been getting tingles in face, hands, legs, feet. Her vision also gets blurry during these episodes. She says its like her migraines but without the pain. Gets very nausous and dizzy during these flares. The episodes last up to an hour and comes and goes throughout the day. Has been on new migraine medication for 3 weeks. Current episode started 1.5 hours ago;     Pt denies fever, vomiting, diarrhea, constipation,.      HPI     Review of Systems    Objective   /74   Pulse 72   Temp 36.2 °C (97.1 °F)   Wt 128 kg (283 lb)   SpO2 98%   BMI 53.47 kg/m²     Physical Exam  Constitutional:       Appearance: She is obese.   HENT:      Head: Normocephalic.      Mouth/Throat:      Mouth: Mucous membranes are moist.   Eyes:      Extraocular Movements: Extraocular movements intact.      Pupils: Pupils are equal, round, and reactive to light.   Cardiovascular:      Rate and Rhythm: Normal rate and regular rhythm.      Heart sounds: Murmur (Aortic stenosis) heard.   Pulmonary:      Effort: Pulmonary effort is normal.      Breath sounds: Normal breath sounds.   Musculoskeletal:      Cervical back: Normal range of motion.   Neurological:      General: No focal deficit present.      Mental Status: She is alert and oriented to person, place, and time.      Comments: CN 2-12 intact   Psychiatric:         Mood and Affect: Mood normal.     EOMI intact; pt complains of diplopia on either    Assessment/Plan   Problem List Items Addressed This Visit             ICD-10-CM    Diabetes mellitus (Multi) - Primary E11.9    Relevant Orders    POCT fingerstick glucose manually resulted (Completed)    Migraine headache G43.909    Relevant Medications    meclizine (Antivert) 25 mg tablet   Acephalgic; vertiginous; occ diplopia    -diabetes  -Severe aortic stenosis (1/18/24)

## 2024-08-27 DIAGNOSIS — G43.909 MIGRAINE WITHOUT STATUS MIGRAINOSUS, NOT INTRACTABLE, UNSPECIFIED MIGRAINE TYPE: Primary | ICD-10-CM

## 2024-08-27 RX ORDER — ATOGEPANT 60 MG/1
60 TABLET ORAL DAILY
Qty: 30 TABLET | Refills: 2 | Status: SHIPPED | OUTPATIENT
Start: 2024-08-27

## 2024-08-28 DIAGNOSIS — K21.9 GASTROESOPHAGEAL REFLUX DISEASE, UNSPECIFIED WHETHER ESOPHAGITIS PRESENT: ICD-10-CM

## 2024-08-28 DIAGNOSIS — J30.89 ALLERGIC RHINITIS DUE TO OTHER ALLERGIC TRIGGER, UNSPECIFIED SEASONALITY: ICD-10-CM

## 2024-08-28 DIAGNOSIS — I51.89 DIASTOLIC DYSFUNCTION: ICD-10-CM

## 2024-08-28 PROCEDURE — RXMED WILLOW AMBULATORY MEDICATION CHARGE

## 2024-08-29 RX ORDER — LANOLIN ALCOHOL/MO/W.PET/CERES
2 CREAM (GRAM) TOPICAL 2 TIMES DAILY
Qty: 120 TABLET | Refills: 10 | Status: SHIPPED | OUTPATIENT
Start: 2024-08-29

## 2024-08-29 RX ORDER — AZELASTINE 1 MG/ML
SPRAY, METERED NASAL
Qty: 30 ML | Refills: 10 | Status: SHIPPED | OUTPATIENT
Start: 2024-08-29

## 2024-08-29 RX ORDER — OMEPRAZOLE 40 MG/1
40 CAPSULE, DELAYED RELEASE ORAL DAILY
Qty: 30 CAPSULE | Refills: 10 | Status: SHIPPED | OUTPATIENT
Start: 2024-08-29

## 2024-09-03 ENCOUNTER — PHARMACY VISIT (OUTPATIENT)
Dept: PHARMACY | Facility: CLINIC | Age: 49
End: 2024-09-03
Payer: COMMERCIAL

## 2024-09-03 ENCOUNTER — APPOINTMENT (OUTPATIENT)
Dept: CARDIOLOGY | Facility: CLINIC | Age: 49
End: 2024-09-03
Payer: COMMERCIAL

## 2024-09-03 DIAGNOSIS — E11.9 TYPE 2 DIABETES MELLITUS WITHOUT COMPLICATION, WITHOUT LONG-TERM CURRENT USE OF INSULIN (MULTI): ICD-10-CM

## 2024-09-04 RX ORDER — SEMAGLUTIDE 1.34 MG/ML
1 INJECTION, SOLUTION SUBCUTANEOUS
Qty: 3 ML | Refills: 3 | Status: SHIPPED | OUTPATIENT
Start: 2024-09-08

## 2024-09-07 PROCEDURE — RXMED WILLOW AMBULATORY MEDICATION CHARGE

## 2024-09-10 ENCOUNTER — APPOINTMENT (OUTPATIENT)
Dept: CARDIOLOGY | Facility: CLINIC | Age: 49
End: 2024-09-10
Payer: COMMERCIAL

## 2024-09-11 ENCOUNTER — PHARMACY VISIT (OUTPATIENT)
Dept: PHARMACY | Facility: CLINIC | Age: 49
End: 2024-09-11
Payer: COMMERCIAL

## 2024-09-13 DIAGNOSIS — G43.909 MIGRAINE WITHOUT STATUS MIGRAINOSUS, NOT INTRACTABLE, UNSPECIFIED MIGRAINE TYPE: ICD-10-CM

## 2024-09-13 RX ORDER — TOPIRAMATE 50 MG/1
TABLET, FILM COATED ORAL
Qty: 30 TABLET | Refills: 0 | Status: SHIPPED | OUTPATIENT
Start: 2024-09-13 | End: 2024-09-26

## 2024-09-20 ENCOUNTER — APPOINTMENT (OUTPATIENT)
Dept: CARDIOLOGY | Facility: CLINIC | Age: 49
End: 2024-09-20
Payer: COMMERCIAL

## 2024-09-24 ENCOUNTER — ANCILLARY PROCEDURE (OUTPATIENT)
Dept: CARDIOLOGY | Facility: CLINIC | Age: 49
End: 2024-09-24
Payer: COMMERCIAL

## 2024-09-24 DIAGNOSIS — E66.01 CLASS 3 SEVERE OBESITY DUE TO EXCESS CALORIES WITH SERIOUS COMORBIDITY AND BODY MASS INDEX (BMI) OF 50.0 TO 59.9 IN ADULT: ICD-10-CM

## 2024-09-24 DIAGNOSIS — I35.0 SEVERE AORTIC STENOSIS BY PRIOR ECHOCARDIOGRAM: Chronic | ICD-10-CM

## 2024-09-24 PROCEDURE — 93306 TTE W/DOPPLER COMPLETE: CPT

## 2024-09-24 PROCEDURE — 93306 TTE W/DOPPLER COMPLETE: CPT | Performed by: INTERNAL MEDICINE

## 2024-09-25 LAB
AORTIC VALVE MEAN GRADIENT: 51.3 MMHG
AORTIC VALVE PEAK VELOCITY: 4.6 M/S
AV PEAK GRADIENT: 84.6 MMHG
AVA (PEAK VEL): 0.97 CM2
AVA (VTI): 0.96 CM2
EJECTION FRACTION APICAL 4 CHAMBER: 70.7
EJECTION FRACTION: 71 %
LEFT ATRIUM VOLUME AREA LENGTH INDEX BSA: 45.7 ML/M2
LEFT VENTRICLE INTERNAL DIMENSION DIASTOLE: 5.12 CM (ref 3.5–6)
LEFT VENTRICULAR OUTFLOW TRACT DIAMETER: 1.87 CM
MITRAL VALVE E/A RATIO: 2.74
RIGHT VENTRICLE FREE WALL PEAK S': 11 CM/S
RIGHT VENTRICLE PEAK SYSTOLIC PRESSURE: 34.1 MMHG
TRICUSPID ANNULAR PLANE SYSTOLIC EXCURSION: 2.3 CM

## 2024-09-26 ENCOUNTER — APPOINTMENT (OUTPATIENT)
Dept: NEUROLOGY | Facility: CLINIC | Age: 49
End: 2024-09-26
Payer: COMMERCIAL

## 2024-09-26 VITALS
BODY MASS INDEX: 52.81 KG/M2 | DIASTOLIC BLOOD PRESSURE: 84 MMHG | SYSTOLIC BLOOD PRESSURE: 126 MMHG | HEART RATE: 68 BPM | HEIGHT: 62 IN | WEIGHT: 287 LBS | TEMPERATURE: 96.9 F

## 2024-09-26 DIAGNOSIS — G43.909 MIGRAINE WITHOUT STATUS MIGRAINOSUS, NOT INTRACTABLE, UNSPECIFIED MIGRAINE TYPE: ICD-10-CM

## 2024-09-26 DIAGNOSIS — I67.1 CEREBRAL ANEURYSM (HHS-HCC): ICD-10-CM

## 2024-09-26 DIAGNOSIS — G43.809 VESTIBULAR MIGRAINE: Primary | ICD-10-CM

## 2024-09-26 PROBLEM — H10.30 ACUTE CONJUNCTIVITIS: Status: ACTIVE | Noted: 2024-09-26

## 2024-09-26 PROBLEM — Z86.2 HISTORY OF IMMUNE DISORDER: Status: ACTIVE | Noted: 2024-09-26

## 2024-09-26 PROBLEM — Z86.79 HISTORY OF HYPERTENSION: Status: ACTIVE | Noted: 2024-09-26

## 2024-09-26 PROBLEM — L30.9 ECZEMA: Status: ACTIVE | Noted: 2024-09-26

## 2024-09-26 PROBLEM — K21.9 GASTROESOPHAGEAL REFLUX DISEASE: Status: ACTIVE | Noted: 2024-09-26

## 2024-09-26 PROBLEM — S80.00XA CONTUSION OF KNEE: Status: ACTIVE | Noted: 2024-09-26

## 2024-09-26 PROBLEM — D22.9 MELANOCYTIC NEVUS: Status: ACTIVE | Noted: 2024-09-26

## 2024-09-26 PROBLEM — D69.6 LOW PLATELET COUNT (CMS-HCC): Status: ACTIVE | Noted: 2022-12-12

## 2024-09-26 PROBLEM — B85.2 LOUSE INFESTATION: Status: ACTIVE | Noted: 2024-09-26

## 2024-09-26 PROBLEM — F41.9 ANXIETY: Status: ACTIVE | Noted: 2024-09-26

## 2024-09-26 PROCEDURE — 3079F DIAST BP 80-89 MM HG: CPT | Performed by: PSYCHIATRY & NEUROLOGY

## 2024-09-26 PROCEDURE — 3008F BODY MASS INDEX DOCD: CPT | Performed by: PSYCHIATRY & NEUROLOGY

## 2024-09-26 PROCEDURE — 99215 OFFICE O/P EST HI 40 MIN: CPT | Performed by: PSYCHIATRY & NEUROLOGY

## 2024-09-26 PROCEDURE — 4010F ACE/ARB THERAPY RXD/TAKEN: CPT | Performed by: PSYCHIATRY & NEUROLOGY

## 2024-09-26 PROCEDURE — 3074F SYST BP LT 130 MM HG: CPT | Performed by: PSYCHIATRY & NEUROLOGY

## 2024-09-26 PROCEDURE — 3044F HG A1C LEVEL LT 7.0%: CPT | Performed by: PSYCHIATRY & NEUROLOGY

## 2024-09-26 RX ORDER — RIZATRIPTAN BENZOATE 10 MG/1
10 TABLET ORAL ONCE AS NEEDED
Qty: 9 TABLET | Refills: 5 | Status: SHIPPED | OUTPATIENT
Start: 2024-09-26 | End: 2024-10-26

## 2024-09-26 RX ORDER — TOPIRAMATE 100 MG/1
100 TABLET, FILM COATED ORAL 2 TIMES DAILY
Qty: 60 TABLET | Refills: 5 | Status: SHIPPED | OUTPATIENT
Start: 2024-09-26

## 2024-09-26 ASSESSMENT — PATIENT HEALTH QUESTIONNAIRE - PHQ9
5. POOR APPETITE OR OVEREATING: MORE THAN HALF THE DAYS
2. FEELING DOWN, DEPRESSED OR HOPELESS: SEVERAL DAYS
9. THOUGHTS THAT YOU WOULD BE BETTER OFF DEAD, OR OF HURTING YOURSELF: NOT AT ALL
8. MOVING OR SPEAKING SO SLOWLY THAT OTHER PEOPLE COULD HAVE NOTICED. OR THE OPPOSITE, BEING SO FIGETY OR RESTLESS THAT YOU HAVE BEEN MOVING AROUND A LOT MORE THAN USUAL: MORE THAN HALF THE DAYS
6. FEELING BAD ABOUT YOURSELF - OR THAT YOU ARE A FAILURE OR HAVE LET YOURSELF OR YOUR FAMILY DOWN: SEVERAL DAYS
SUM OF ALL RESPONSES TO PHQ QUESTIONS 1-9: 14
3. TROUBLE FALLING OR STAYING ASLEEP OR SLEEPING TOO MUCH: MORE THAN HALF THE DAYS
4. FEELING TIRED OR HAVING LITTLE ENERGY: MORE THAN HALF THE DAYS
SUM OF ALL RESPONSES TO PHQ9 QUESTIONS 1 AND 2: 3
7. TROUBLE CONCENTRATING ON THINGS, SUCH AS READING THE NEWSPAPER OR WATCHING TELEVISION: MORE THAN HALF THE DAYS
1. LITTLE INTEREST OR PLEASURE IN DOING THINGS: MORE THAN HALF THE DAYS

## 2024-09-26 ASSESSMENT — PAIN SCALES - GENERAL: PAINLEVEL: 0-NO PAIN

## 2024-09-26 NOTE — PROGRESS NOTES
"Subjective   Leslee Rushing is a 49 y.o. female who comes in for follow up of migraines.    She reports that she is having dizziness instead of pain.   She reports that she has spinning. She notices it is triggered by sitting up or walking.  Fast movement.  She notices that her eyes feels unfocused.  When a headache starts they become unfocused.      She reports that when she has dizziness she has photophobia, nausea.  There is no pain.  It will last for a few hours.  She takes tylenol or motrin, which will help.  She reports that the symptoms will return within the same day.  She took the Nurtec yesterday.      She has never done vestibular therapy.          Review of Systems    Objective   /84 (BP Location: Left arm, Patient Position: Sitting, BP Cuff Size: Large adult long)   Pulse 68   Temp 36.1 °C (96.9 °F)   Ht 1.575 m (5' 2\")   Wt 130 kg (287 lb)   BMI 52.49 kg/m²   Neurological Exam  Physical Exam    EOMI, no nystagmus, finger to nose intact, THEA intact, gait stable.     Assessment/Plan   Ms. Rushing is a 47-year-old woman presenting to the neurology clinic today for follow up of chronic headaches and a previous ICA aneurysm. Patient is currently having migraines most consistent with vestibular migraine.  These have been occurring over the last 6 months.  Recommend switching nurtec every other day to Qulipta as she seems to get more migraines on the days when she does not take the Nurtec.  Will add rizatriptan for abortive treatment and increase Topamax up to 100 mg twice a day.    Last MRA 3/2023, will repeat to follow up aneurysms. Depending on results consider vascular neurosurgery evaluation.    Parul Ch, DO      "

## 2024-09-26 NOTE — PATIENT INSTRUCTIONS
Recommend that you stop the Nurtec and start Qulipta. Will have you increase the topiramate to 50 mg in the morning and 100 mg at night for 2 weeks then if tolerated increase to 100 mg twice a day.  Then for abortive treatment of the vestibular migraine, will have you try rizatriptan.  Then will repeat MRA since it been almost two years to monitor for stability of aneurysm.

## 2024-09-27 DIAGNOSIS — E78.5 HYPERLIPIDEMIA, UNSPECIFIED HYPERLIPIDEMIA TYPE: ICD-10-CM

## 2024-09-30 ENCOUNTER — APPOINTMENT (OUTPATIENT)
Dept: GASTROENTEROLOGY | Facility: CLINIC | Age: 49
End: 2024-09-30
Payer: COMMERCIAL

## 2024-09-30 ENCOUNTER — OFFICE VISIT (OUTPATIENT)
Dept: CARDIOLOGY | Facility: CLINIC | Age: 49
End: 2024-09-30
Payer: COMMERCIAL

## 2024-09-30 ENCOUNTER — ANCILLARY PROCEDURE (OUTPATIENT)
Dept: CARDIOLOGY | Facility: CLINIC | Age: 49
End: 2024-09-30
Payer: COMMERCIAL

## 2024-09-30 VITALS
SYSTOLIC BLOOD PRESSURE: 170 MMHG | BODY MASS INDEX: 52.63 KG/M2 | DIASTOLIC BLOOD PRESSURE: 72 MMHG | HEIGHT: 62 IN | OXYGEN SATURATION: 97 % | WEIGHT: 286 LBS | HEART RATE: 76 BPM

## 2024-09-30 DIAGNOSIS — E66.813 CLASS 3 SEVERE OBESITY DUE TO EXCESS CALORIES WITH SERIOUS COMORBIDITY AND BODY MASS INDEX (BMI) OF 50.0 TO 59.9 IN ADULT: ICD-10-CM

## 2024-09-30 DIAGNOSIS — E78.2 MIXED HYPERLIPIDEMIA: ICD-10-CM

## 2024-09-30 DIAGNOSIS — E66.01 CLASS 3 SEVERE OBESITY DUE TO EXCESS CALORIES WITH SERIOUS COMORBIDITY AND BODY MASS INDEX (BMI) OF 50.0 TO 59.9 IN ADULT: ICD-10-CM

## 2024-09-30 DIAGNOSIS — I10 PRIMARY HYPERTENSION: ICD-10-CM

## 2024-09-30 DIAGNOSIS — I35.0 SEVERE AORTIC STENOSIS BY PRIOR ECHOCARDIOGRAM: Primary | Chronic | ICD-10-CM

## 2024-09-30 LAB
ATRIAL RATE: 73 BPM
P AXIS: 14 DEGREES
P OFFSET: 141 MS
P ONSET: 101 MS
PR INTERVAL: 226 MS
Q ONSET: 214 MS
QRS COUNT: 12 BEATS
QRS DURATION: 104 MS
QT INTERVAL: 468 MS
QTC CALCULATION(BAZETT): 515 MS
QTC FREDERICIA: 499 MS
R AXIS: 27 DEGREES
T AXIS: 115 DEGREES
T OFFSET: 448 MS
VENTRICULAR RATE: 73 BPM

## 2024-09-30 PROCEDURE — 99214 OFFICE O/P EST MOD 30 MIN: CPT | Performed by: INTERNAL MEDICINE

## 2024-09-30 PROCEDURE — 3044F HG A1C LEVEL LT 7.0%: CPT | Performed by: INTERNAL MEDICINE

## 2024-09-30 PROCEDURE — 4010F ACE/ARB THERAPY RXD/TAKEN: CPT | Performed by: INTERNAL MEDICINE

## 2024-09-30 PROCEDURE — 3078F DIAST BP <80 MM HG: CPT | Performed by: INTERNAL MEDICINE

## 2024-09-30 PROCEDURE — 93010 ELECTROCARDIOGRAM REPORT: CPT | Performed by: INTERNAL MEDICINE

## 2024-09-30 PROCEDURE — 99214 OFFICE O/P EST MOD 30 MIN: CPT | Mod: 25 | Performed by: INTERNAL MEDICINE

## 2024-09-30 PROCEDURE — G2211 COMPLEX E/M VISIT ADD ON: HCPCS | Performed by: INTERNAL MEDICINE

## 2024-09-30 PROCEDURE — 3008F BODY MASS INDEX DOCD: CPT | Performed by: INTERNAL MEDICINE

## 2024-09-30 PROCEDURE — 3077F SYST BP >= 140 MM HG: CPT | Performed by: INTERNAL MEDICINE

## 2024-09-30 PROCEDURE — 93005 ELECTROCARDIOGRAM TRACING: CPT

## 2024-09-30 RX ORDER — ATORVASTATIN CALCIUM 10 MG/1
TABLET, FILM COATED ORAL
Qty: 30 TABLET | Refills: 3 | Status: SHIPPED | OUTPATIENT
Start: 2024-09-30

## 2024-09-30 RX ORDER — BISMUTH SUBSALICYLATE 262 MG
1 TABLET,CHEWABLE ORAL DAILY
COMMUNITY

## 2024-09-30 ASSESSMENT — PAIN SCALES - GENERAL: PAINLEVEL: 0-NO PAIN

## 2024-09-30 NOTE — PATIENT INSTRUCTIONS
"It was a pleasure seeing you today. I would like to see you back in clinic in about 6 Months You can call my office if questions arise between now and our next visit.      DONT FORGET THAT YOU AND AIXA MADE A PINKIE PROMISE.    ++ 1/2 to 1 pound PER WEEK of Weight Loos   ++ NO VAPING!!!!    WATCH AND EXERCISE WITH SHERRI PEREIRA ON [employer] TO HELP YOU GET IN SHAPE AND LOSE WEIGHT      Continue on your current medications.  -- PLease Decrease the amount of fluid that you are drinking. This is \"undoing\" what the diuretics do.      -- Continue Bumex at 2 mg Twice a Day      -- INcrease your magnesium supplements to 800 mg Twice A Day      -- Begin Using POtassium Supplements Daily      -- Get blood work about 1-2 weeks before we see each other     -- Schedule an Echocardiogram in 6 months to keep track of your heart valve.      Please try to cut back on the number of cigarettes. He smoked. Try to increase the interval between cigarettes and try to use substitutes such as sugar-free candy carrots,celery sticks as in between.  Once again down to less than half a pack a day try to go cold turkey.   try to designate areas in the your home as smoke-free areas. Try to reduce the number of ashtrays and other reminders of smoking. Try to keep any major something that you dislike next to your cigarette pack to try to develop a mental aversion to smoking     I would like you to work on the amount of exercise that you are doing. Every little step that you can take to increase your activity will pay off in the long-term for you. Please try and do more of your stairs. Try and go up and down them at least once to twice per day. This will help increase your activity levels and improve your overall cardiovascular health.     - We recommend you follow a heart healthy diet. Watch food labels and try not to eat more than 2,500 mg of sodium per day. Avoid foods high in salt like processed meats (lunch meats, lazaro, and sausage), " "processed foods (boxed dinners, canned soups), fried and fast foods. Monitor serving sizes and if the sodium per serving size is more than 200 mg, avoid those foods. If the sodium per serving size is between 100-200 mg, you can use those in limited quantities. Try to choose foods where the amount of sodium per serving size is less than 100 mg. Try to eat a diet rich in fruits and vegetables, whole grains, low fat dairy products, skinless poultry and fish, nuts, beans, non-tropical vegetable oils. Limit saturated fat, trans fat, sodium, red meats, and sugar-sweetened beverages.   Limit alcohol      -The combination of a reduced-calorie diet and increased physical activity is recommended. Adults should aim to get at least 150 minutes of moderate physical activity per week (30 minutes of moderate physical activities at least 5 days per week). Examples of moderate physical activities include brisk walking, swimming, aerobic dancing, heavy gardening, jumping rope, bicycling 10 MPH or faster, tennis, hiking uphill or with a heavy backpack. Please let us know if you would like to learn more about your nutrition and calories and additional options including weight loss programs to help you reach your goal.      -If you smoke, stop smoking. iIf you stop smoking you can help get rid of a major source of stress to your heart. Smoking makes your heart rate and blood pressure go up and increases your risk or developing cardiovascular diseases and worsen symptoms associated with heart failure.      -Obtain a BP monitor and monitor your BP daily. Check it around the same time each day; at least 1 hour after taking your medications. Record your BP in a log and bring your log with you to your doctors appointment.      -F/u with your PCP as recommended.      - If you are having problems with medications, consider using \"GoodRx\" to help locate a more cost effective measure to obtain medications. We are happy to supply written " prescriptions if needed to allow you to obtain your medications from different pharmacies. Additionally, if you are having issues with mail order delivery, please let us know. We can send a limited supply of your medications to your local pharmacy. e

## 2024-09-30 NOTE — PROGRESS NOTES
"Chief Complaint:   Hypertension, Hyperlipidemia, and Follow-up (Pt c/o intermittent left arm numbness and tingling)     History Of Present Illness:    Leslee Rushing is a 49 y.o. female presenting with  PMHx significant for impaired diastolic filling, ef 70-75%, mild to moderate AS with mild LV subaortic gradient on 3/2018 echo, F/U Echo 05/2020 showed EF 75-80% Hyperdynamic with Pseudomonal Diastology, Moderate- Severe A/S ( P=65.4 / M= 45.9 DI = 0.44) small right ICA aneurysm on 3/2018 MRI/MRA head, HTN, HLD, PRINCESS, ITP, YULY compliant with CPAP, GERD, DM2, GERD here for follow up for CHF / Valvular Disease.      She is seen in the presence of her Daughter, Dea, and grants permission to discuss her medical care with daughter present.      He has not been seen in roughly 15 months.     She returns today because of worsening shortness of breath. She states that she has been compliant with all medications, but does note \"I drink an awful lot of water\". She states that she has had increased weight gain, shortness of breath, dyspnea on exertion, orthopnea and paroxysmal nocturnal dyspnea. She is compliant with CPAP. She was seen by her primary care provider who had increased her torsemide, but does not seem to have made much difference for her. She notes some con    Today ( 1/19/2023) she returns for follow up. She feels much improved. She has been exercising more    2/20/20204 --she returns for follow-up after another prolonged period of absence.  She was supposed to have followed up in weeks after her last encounter. She is seen with her Daughter Lawanda. Permission is granted to discuss medical care in her presence.  She completed  an echocardiogram for further assessment of her known Aortic Stenosis. She has  decreasesed her Vaping habit and is off nicotine.  She notes that about 2-3 days ago, she had some increased shortness of breath.  She had not taken her \"Water Pills and she woke up short of breath\" since " "resuming a lot of pills, she has overall felt better.    9/30/2024 -- She returns for follow up. She is accompanies by her daughter Madyson Rushing.  She reports that she has been able to decrease her Vaping Habit and quit for 2 month. She otherwise reports that she recently moved into [place] into a 2 story house with basement, main floor and 2nd floor with the Main Bathroom. She is able to go up a flight of stairs, but doesn't attempt 2 in case she loses her breath. She has not had any chest pain, dizziness,     Patient denies chest pain and angina. Pt reports shortness of breath, dyspnea on exertion, orthopnea, and paroxysmal nocturnal dyspnea overall improving with reinstitution of diuretics. Pt reports worsening lower extremity edema. Reports increased abdominal girth. Pt denies palpitations or syncope. No recent falls. No fever or chills. No cough. No change in bowel or bladder habits. No travel. No sick contacts. No recent travel     12 point review of systems was performed and is otherwise negative.  Past medical history: As above.  Medications: Reviewed.  Allergies: Reviewed.  Social history: Patient denies smoking, alcohol abuse, or illicit drug use. She reports \"Vaping\" on e pod daily She has decreased from 5 ooo \"Puffs per week\" to 750 \"Puffs per week\" . She has quit for approximately 1 week   Family history: No sudden cardiac death or premature coronary artery disease..     Last Recorded Vitals:  Vitals:    09/30/24 1012 09/30/24 1016 09/30/24 1022   BP: 163/80 152/82 170/72   BP Location: Right arm Right arm Right arm   Patient Position: Lying Sitting Standing   Pulse: 70 74 76   SpO2: 97%     Weight: 130 kg (286 lb)     Height: 1.575 m (5' 2\")         Past Medical History:  She has a past medical history of Bipolar disorder, currently in remission, most recent episode unspecified (Multi) (07/30/2021), Body mass index (BMI) 50.0-59.9, adult (Multi) (02/28/2024), Cellulitis of right lower limb " (01/22/2018), Other chest pain (10/27/2017), Other chronic allergic conjunctivitis (06/01/2018), Other hypertrophic disorders of the skin (05/10/2017), Other muscle spasm (04/22/2014), Pain in left knee (03/07/2017), Pain in right leg (07/25/2018), Personal history of diseases of the blood and blood-forming organs and certain disorders involving the immune mechanism, Personal history of diseases of the skin and subcutaneous tissue (05/05/2020), Personal history of other benign neoplasm (05/10/2017), Personal history of other diseases of the circulatory system (05/06/2020), Personal history of other diseases of the circulatory system, Personal history of other diseases of the circulatory system (02/15/2022), Personal history of other diseases of the digestive system (07/30/2021), Personal history of other diseases of the digestive system (01/14/2021), Personal history of other diseases of the digestive system (01/13/2017), Personal history of other diseases of the nervous system and sense organs, Personal history of other diseases of the nervous system and sense organs (02/03/2016), Personal history of other diseases of the nervous system and sense organs (10/09/2020), Personal history of other diseases of the respiratory system (05/02/2017), Personal history of other diseases of the respiratory system (05/02/2017), Personal history of other diseases of the respiratory system (04/04/2017), Personal history of other diseases of the respiratory system, Personal history of other infectious and parasitic diseases (09/03/2020), Personal history of other infectious and parasitic diseases (04/22/2014), Personal history of other medical treatment (02/27/2019), Personal history of other mental and behavioral disorders (03/07/2017), Personal history of other mental and behavioral disorders (06/29/2013), Personal history of other specified conditions (09/11/2014), Personal history of other specified conditions (06/01/2018),  Personal history of other specified conditions (10/27/2017), Rash and other nonspecific skin eruption (05/10/2017), Unspecified diastolic (congestive) heart failure (Multi) (10/18/2021), Unspecified mastoiditis, left ear (2016), and Xerosis cutis (2017).    Past Surgical History:  She has a past surgical history that includes  section, classic (2013); Other surgical history (2022); Other surgical history (2022); Other surgical history (2022); MR angio head wo IV contrast (3/27/2018); CT angio head w and wo IV contrast (2023); and MR angio head wo IV contrast (2023).      Social History:  She reports that she quit smoking about 23 years ago. Her smoking use included cigarettes. She has been exposed to tobacco smoke. She uses smokeless tobacco. She reports current alcohol use. She reports that she does not use drugs.    Family History:  Family History   Problem Relation Name Age of Onset    Coronary artery disease Mother      Sudden death Mother      Hypertension Mother      Hypertension Father      Hypertension Sister      Diabetes type II Sister      Diabetes type II Mother's Sister      Diabetes type II Maternal Grandmother      Colon cancer Maternal Grandfather          Allergies:  Lisinopril, Tree nuts, and Penicillins    Outpatient Medications:  Current Outpatient Medications   Medication Instructions    albuterol 90 mcg/actuation inhaler INHALE TWO (2) PUFFS BY MOUTH EVERY 4-6 HOURS AS NEEDED FOR WHEEZING    atorvastatin (LIPITOR) 10 mg, oral, Nightly    azelastine (Astelin) 137 mcg (0.1 %) nasal spray INSTILL TWO (2) SPRAYS IN EACH NOSTRIL TWICE DAILY    blood-glucose meter (OneTouch Verio Flex meter) misc USE TO TEST BLOOD SUGAR DAILY    bumetanide (Bumex) 2 mg tablet Take one twice a day    busPIRone (BUSPAR) 30 mg, oral, 2 times daily    ELDERBERRY FRUIT ORAL oral    fluticasone (Flonase) 50 mcg/actuation nasal spray 2 sprays, Each Nostril, Daily     "gabapentin (NEURONTIN) 100 mg, oral, 4 times daily    hydrOXYzine HCL (ATARAX) 50 mg, oral, 3 times daily, As needed    lactulose 20 gram/30 mL oral solution GIVE 30 ML EVERY 2 HOURS UNTIL SHE HAS A BOWEL MOVEMENT--THEN TWICE A DAY FOR MAINTENANCE    lamoTRIgine (LaMICtal) 100 mg tablet 1 tablet, oral, Daily    loratadine (CLARITIN) 10 mg, oral, Daily    losartan (COZAAR) 100 mg, oral, Daily    lurasidone (LATUDA) 60 mg, oral    magnesium oxide (MAG-OX) 800 mg, oral, 2 times daily    meclizine (ANTIVERT) 25 mg, oral, 3 times daily PRN    metFORMIN XR (GLUCOPHAGE-XR) 2,000 mg, oral, Daily    montelukast (SINGULAIR) 10 mg, oral, Daily    multivitamin tablet 1 tablet, oral, Daily    omeprazole (PRILOSEC) 40 mg, oral, Daily    Ozempic 1 mg, subcutaneous, Once Weekly    potassium chloride CR 20 mEq ER tablet 40 mEq, oral, Daily    Qulipta 60 mg, oral, Daily    rimegepant (Nurtec ODT) 75 mg tablet,disintegrating Take one tablet by mouth every other day    rizatriptan (MAXALT) 10 mg, oral, Once as needed, May repeat in 2 hours if unresolved. Do not exceed 30 mg in 24 hours.    topiramate (TOPAMAX) 100 mg, oral, 2 times daily    verapamil SR (CALAN-SR) 240 mg, oral, Daily       Physical Exam:  PHYSICAL EXAMINATION  /72 (BP Location: Right arm, Patient Position: Standing)   Pulse 76   Ht 1.575 m (5' 2\")   Wt 130 kg (286 lb)   SpO2 97%   BMI 52.31 kg/m²   GENERAL APPEARANCE: Well developed, well nourished, in no acute distress.  SKIN: Inspection of the skin reveals no rashes,   HEENT: The sclerae were anicteric  or masses.   NECK: Supple and symmetric. There was no thyroid enlargement, and no tenderness, or masses were felt.  CHEST: Normal AP diameter and normal contour without any kyphoscoliosis.  LUNGS: Auscultation of the lungs revealed normal breath sounds without any other adventitious sounds or rubs.  CARDIOVASCULAR: There was a regular rate and rhythm with a 3/6 systolic ejection murmur that radiates to the " base of the neck.. The carotid pulses were normal and 2+ bilaterally without bruits. Peripheral pulses were 2+ and symmetric.  ABDOMEN: Soft and nontender with normal bowel sounds.  LYMPH NODES: No lymphadenopathy was appreciated in the neck  MUSCULOSKELETAL: Gait was normal. There was no tenderness or effusions noted. Muscle strength and tone were normal.  EXTREMITIES: No cyanosis, clubbing or edema.  NEUROLOGIC: Alert and oriented x 3. Normal affect. Gait was normal. Normal deep tendon reflexes with no pathological reflexes. Sensation to touch was normal.       Last Labs:  CBC -  Lab Results   Component Value Date    WBC 9.2 04/27/2023    HGB 13.1 04/27/2023    HCT 41.8 04/27/2023    MCV 85 04/27/2023     04/27/2023       CMP -  Lab Results   Component Value Date    CALCIUM 9.3 04/27/2023    PHOS 3.6 03/27/2018    PROT 7.1 04/27/2023    ALBUMIN 4.1 04/27/2023    AST 25 04/27/2023    ALT 31 04/27/2023    ALKPHOS 71 04/27/2023    BILITOT 0.3 04/27/2023       LIPID PANEL -   Lab Results   Component Value Date    CHOL 162 04/27/2023    TRIG 287 (H) 04/27/2023    HDL 44.5 04/27/2023    CHHDL 3.6 04/27/2023    LDLF 60 04/27/2023    VLDL 57 (H) 04/27/2023    NHDL 118 04/27/2023       RENAL FUNCTION PANEL -   Lab Results   Component Value Date    GLUCOSE 177 (H) 04/27/2023     (L) 04/27/2023    K 3.7 04/27/2023     04/27/2023    CO2 20 (L) 04/27/2023    ANIONGAP 16 04/27/2023    BUN 14 04/27/2023    CREATININE 0.76 04/27/2023    CALCIUM 9.3 04/27/2023    PHOS 3.6 03/27/2018    ALBUMIN 4.1 04/27/2023        Lab Results   Component Value Date    BNP 37 12/15/2022    HGBA1C 6.0 07/17/2024       Last Cardiology Tests:  ECG:  In Office EKG  02/20/2024 --       Echo:   Trans/thoracic Echo (TTE) Complete 09/24/2024   1. Left ventricular ejection fraction is normal, calculated by Esquivel's biplane at 71%.   2. There is normal right ventricular global systolic function.   3. The left atrium is moderately  dilated.   4. Slightly elevated right ventricular systolic pressure.   5. Severe aortic valve stenosis.   6. Aortic Gradients 2/12/2024 Pk = 81.3 mmHg / Mn 47.0 mmHg DI 0.42, YOAN by VTI 1.22 cm2. Currently Pk 84.6 mmHg / Mn 51.3 mmHg DI 0.35 YOAN by VTI 0.97 cm2.   7. There is moderate to severe aortic valve cusp calcification.   8. Moderate aortic valve regurgitation.    Trans/thoracic Echo (TTE) Complete 02/12/2024   1. Left ventricular systolic function is normal with a 65% estimated ejection fraction.   2. Mild mitral valve regurgitation.   3. Severe aortic valve stenosis.   4. The aortic valve appears tricuspid with restriction.   5. There is moderate aortic valve cusp calcification.   6. Mild to moderate aortic valve regurgitation.   7. The peak instantaneous gradient of the aortic valve is 81.3 mmHg.    Echocardiogram 01/2023  1. Left ventricular systolic function is normal with a 55-60% estimated ejection fraction.   2. Spectral Doppler shows a pseudonormal pattern of left ventricular diastolic filling.   3. No left ventricular thrombus visualized.   4. There is no evidence of cardiac tamponade.   5. Mildly elevated RVSP.   6. Aortic valve appears abnormal.   7. Moderate to severe aortic valve stenosis.  Peak gradient 79.7 mean gradient 46 DI 0.41   8. Moderate aortic valve regurgitation.    Ejection Fractions:  EF   Date/Time Value Ref Range Status   09/24/2024 08:55 AM 71 %        Cath:  No results found for this or any previous visit from the past 1095 days.      Stress Test:  No results found for this or any previous visit from the past 1095 days.      Cardiac Imaging:  No results found for this or any previous visit from the past 1095 days.        Lab review: I have personally reviewed the laboratory result(s) =  Diagnostic review: I have personally reviewed the result(s) of the EKG and Echocardiogram .   Imaging review: I have  personally reviewed the result(s)     Assessment/Plan     Problem List Items  "Addressed This Visit       Severe aortic stenosis by prior echocardiogram - Primary (Chronic)    Overview     Severe aortic stenosis by echocardiography  Echocardiogram 09/2024  Aortic Gradients Pk 84.6 mmHg / Mn 51.3 mmHg DI 0.35 YOAN by VTI 0.97 cm2.    Echocardiogram 2/2024  -- Peak 81.3 / Mean 47.0 / DI 0.42, YOAN by VTI 1.22  -- Aortic insufficiency pressure half-time 419    Echocardiogram 1/2023  -- Peak 79.7/mean 46.0/DI 0.41, YOAN by VTI 1.07  -- Aortic insufficiency pressure half-time 436    Echocardiogram 11/2021  -- Peak 57.1/mean 27.2/DI 0.37, YOAN by VTI 1.01               Current Assessment & Plan     She remains asymptomatic.  There has not been decline in her ejection fraction.  There has not been a decline in her exercise tolerance although this is somewhat limited.  --Continue to follow with serial EKGs every 6 months at this time  --I have encouraged her to increase her diet, exercise intervals in anticipation of expected surgery as well as to gauge for any anginal symptoms related to severe aortic stenosis         Relevant Orders    Transthoracic echo (TTE) complete    Class 3 severe obesity due to excess calories with serious comorbidity and body mass index (BMI) of 50.0 to 59.9 in adult (Multi)    Relevant Orders    Transthoracic echo (TTE) complete    Hyperlipidemia    Overview     The 10-year ASCVD risk score (Shan HA, et al., 2019) is: 4.3%    Values used to calculate the score:      Age: 49 years      Sex: Female      Is Non- : No      Diabetic: Yes      Tobacco smoker: No      Systolic Blood Pressure: 170 mmHg      Is BP treated: Yes      HDL Cholesterol: 44.5 mg/dL      Total Cholesterol: 162 mg/dL    Lab Results   Component Value Date    CHOL 162 04/27/2023    CHOL 155 07/30/2021    CHOL 165 02/26/2020     Lab Results   Component Value Date    HDL 44.5 04/27/2023    HDL 46.2 07/30/2021    HDL 46.5 02/26/2020     No results found for: \"LDLCALC\"  Lab Results " "  Component Value Date    TRIG 287 (H) 04/27/2023    TRIG 122 07/30/2021    TRIG 127 02/26/2020     No components found for: \"CHOLHDL\"           Current Assessment & Plan     Currently maintained Atorvastatin 10 mg Po Daily   Blood work placed to allow reassessment.          Hypertension    Current Assessment & Plan     Medicatons 09/2024  -- Bumex 2 mg   == Losartan 100 mg   == Verapamil 240 mg   ==          Relevant Orders    ECG 12 lead (Clinic Performed) (Completed)         Demario Landa,   "

## 2024-10-02 ENCOUNTER — APPOINTMENT (OUTPATIENT)
Dept: GASTROENTEROLOGY | Facility: CLINIC | Age: 49
End: 2024-10-02
Payer: COMMERCIAL

## 2024-11-01 DIAGNOSIS — E11.42 DIABETIC POLYNEUROPATHY ASSOCIATED WITH TYPE 2 DIABETES MELLITUS (MULTI): ICD-10-CM

## 2024-11-05 RX ORDER — GABAPENTIN 100 MG/1
100 CAPSULE ORAL 4 TIMES DAILY
Qty: 360 CAPSULE | Refills: 0 | Status: SHIPPED | OUTPATIENT
Start: 2024-11-05

## 2024-11-06 PROCEDURE — RXMED WILLOW AMBULATORY MEDICATION CHARGE

## 2024-11-07 ENCOUNTER — PHARMACY VISIT (OUTPATIENT)
Dept: PHARMACY | Facility: CLINIC | Age: 49
End: 2024-11-07
Payer: COMMERCIAL

## 2024-11-16 ENCOUNTER — APPOINTMENT (OUTPATIENT)
Dept: RADIOLOGY | Facility: HOSPITAL | Age: 49
End: 2024-11-16
Payer: COMMERCIAL

## 2024-11-26 DIAGNOSIS — I10 HYPERTENSION, UNSPECIFIED TYPE: ICD-10-CM

## 2024-11-29 RX ORDER — VERAPAMIL HCL 240 MG
240 TABLET, EXTENDED RELEASE ORAL DAILY
Qty: 30 TABLET | Refills: 10 | Status: SHIPPED | OUTPATIENT
Start: 2024-11-29

## 2024-11-30 PROCEDURE — RXMED WILLOW AMBULATORY MEDICATION CHARGE

## 2024-12-04 ENCOUNTER — PHARMACY VISIT (OUTPATIENT)
Dept: PHARMACY | Facility: CLINIC | Age: 49
End: 2024-12-04
Payer: COMMERCIAL

## 2024-12-27 DIAGNOSIS — I10 HYPERTENSION, UNSPECIFIED TYPE: ICD-10-CM

## 2024-12-27 DIAGNOSIS — J45.909 ASTHMA, UNSPECIFIED ASTHMA SEVERITY, UNSPECIFIED WHETHER COMPLICATED, UNSPECIFIED WHETHER PERSISTENT (HHS-HCC): ICD-10-CM

## 2024-12-27 DIAGNOSIS — I51.89 DIASTOLIC DYSFUNCTION: ICD-10-CM

## 2024-12-27 DIAGNOSIS — E11.9 TYPE 2 DIABETES MELLITUS WITHOUT COMPLICATION, WITHOUT LONG-TERM CURRENT USE OF INSULIN (MULTI): ICD-10-CM

## 2024-12-28 DIAGNOSIS — I51.89 DIASTOLIC DYSFUNCTION: ICD-10-CM

## 2024-12-29 ENCOUNTER — ANCILLARY PROCEDURE (OUTPATIENT)
Dept: URGENT CARE | Age: 49
End: 2024-12-29
Payer: COMMERCIAL

## 2024-12-29 ENCOUNTER — OFFICE VISIT (OUTPATIENT)
Dept: URGENT CARE | Age: 49
End: 2024-12-29
Payer: COMMERCIAL

## 2024-12-29 VITALS
HEIGHT: 61 IN | SYSTOLIC BLOOD PRESSURE: 135 MMHG | BODY MASS INDEX: 53.62 KG/M2 | DIASTOLIC BLOOD PRESSURE: 65 MMHG | HEART RATE: 76 BPM | WEIGHT: 284 LBS | TEMPERATURE: 96.4 F | OXYGEN SATURATION: 97 %

## 2024-12-29 DIAGNOSIS — R05.9 COUGH, UNSPECIFIED TYPE: ICD-10-CM

## 2024-12-29 PROCEDURE — 3078F DIAST BP <80 MM HG: CPT | Performed by: NURSE PRACTITIONER

## 2024-12-29 PROCEDURE — 3075F SYST BP GE 130 - 139MM HG: CPT | Performed by: NURSE PRACTITIONER

## 2024-12-29 PROCEDURE — 4010F ACE/ARB THERAPY RXD/TAKEN: CPT | Performed by: NURSE PRACTITIONER

## 2024-12-29 PROCEDURE — 71046 X-RAY EXAM CHEST 2 VIEWS: CPT | Performed by: NURSE PRACTITIONER

## 2024-12-29 PROCEDURE — 99203 OFFICE O/P NEW LOW 30 MIN: CPT | Performed by: NURSE PRACTITIONER

## 2024-12-29 PROCEDURE — 3008F BODY MASS INDEX DOCD: CPT | Performed by: NURSE PRACTITIONER

## 2024-12-29 PROCEDURE — 3044F HG A1C LEVEL LT 7.0%: CPT | Performed by: NURSE PRACTITIONER

## 2024-12-29 RX ORDER — BUDESONIDE AND FORMOTEROL FUMARATE DIHYDRATE 80; 4.5 UG/1; UG/1
2 AEROSOL RESPIRATORY (INHALATION)
Qty: 10.2 G | Refills: 0 | Status: SHIPPED | OUTPATIENT
Start: 2024-12-29 | End: 2025-12-29

## 2024-12-29 RX ORDER — AZITHROMYCIN 250 MG/1
TABLET, FILM COATED ORAL
Qty: 6 TABLET | Refills: 0 | Status: SHIPPED | OUTPATIENT
Start: 2024-12-29

## 2024-12-29 RX ORDER — PREDNISONE 20 MG/1
20 TABLET ORAL DAILY
Qty: 10 TABLET | Refills: 0 | Status: SHIPPED | OUTPATIENT
Start: 2024-12-29 | End: 2025-01-08

## 2024-12-29 ASSESSMENT — ENCOUNTER SYMPTOMS: COUGH: 1

## 2024-12-29 NOTE — PROGRESS NOTES
Subjective   Patient ID: Leslee Rushing is a 49 y.o. female. They present today with a chief complaint of Cough (Cough for 1 month. Turning into a wheeze and won't go away).    History of Present Illness  COugh with wheezing for over a month with symptoms worsening  Denies new fevers  Pt is diabetic  Last A1c 6.0 controlled with metformin and ozempic        Cough        Past Medical History  Allergies as of 12/29/2024 - Reviewed 12/29/2024   Allergen Reaction Noted    Lisinopril Cough 02/19/2019    Tree nuts Headache 02/20/2024    Penicillins Rash 06/05/2013       (Not in a hospital admission)       Past Medical History:   Diagnosis Date    Bipolar disorder, currently in remission, most recent episode unspecified (Multi) 07/30/2021    History of depressed bipolar disorder    Body mass index (BMI) 50.0-59.9, adult (Multi) 02/28/2024    Cellulitis of right lower limb 01/22/2018    Cellulitis of right lower leg    Other chest pain 10/27/2017    Atypical chest pain    Other chronic allergic conjunctivitis 06/01/2018    Chronic allergic conjunctivitis    Other hypertrophic disorders of the skin 05/10/2017    Skin tag    Other muscle spasm 04/22/2014    Trapezius muscle spasm    Pain in left knee 03/07/2017    Knee pain, left    Pain in right leg 07/25/2018    Right leg pain    Personal history of diseases of the blood and blood-forming organs and certain disorders involving the immune mechanism     History of autoimmune disorder    Personal history of diseases of the skin and subcutaneous tissue 05/05/2020    History of eczema    Personal history of other benign neoplasm 05/10/2017    History of other benign neoplasm    Personal history of other diseases of the circulatory system 05/06/2020    History of aneurysm    Personal history of other diseases of the circulatory system     History of hypertension    Personal history of other diseases of the circulatory system 02/15/2022    History of aortic valve stenosis     Personal history of other diseases of the digestive system 07/30/2021    History of gastroesophageal reflux (GERD)    Personal history of other diseases of the digestive system 01/14/2021    History of fatty infiltration of liver    Personal history of other diseases of the digestive system 01/13/2017    History of oral pain    Personal history of other diseases of the nervous system and sense organs     History of sleep apnea    Personal history of other diseases of the nervous system and sense organs 02/03/2016    History of earache    Personal history of other diseases of the nervous system and sense organs 10/09/2020    History of acute conjunctivitis    Personal history of other diseases of the respiratory system 05/02/2017    History of tonsillitis    Personal history of other diseases of the respiratory system 05/02/2017    History of sore throat    Personal history of other diseases of the respiratory system 04/04/2017    History of bronchitis    Personal history of other diseases of the respiratory system     History of asthma    Personal history of other infectious and parasitic diseases 09/03/2020    History of pediculosis    Personal history of other infectious and parasitic diseases 04/22/2014    History of athlete's foot    Personal history of other medical treatment 02/27/2019    History of removal of staples    Personal history of other mental and behavioral disorders 03/07/2017    History of anxiety    Personal history of other mental and behavioral disorders 06/29/2013    History of depression    Personal history of other specified conditions 09/11/2014    History of headache    Personal history of other specified conditions 06/01/2018    History of nasal congestion    Personal history of other specified conditions 10/27/2017    History of edema    Rash and other nonspecific skin eruption 05/10/2017    Rash    Unspecified diastolic (congestive) heart failure (Multi) 10/18/2021    Heart failure with  "preserved left ventricular function (HFpEF)    Unspecified mastoiditis, left ear 2016    Mastoiditis of left side    Xerosis cutis 2017    Xerosis cutis       Past Surgical History:   Procedure Laterality Date     SECTION, CLASSIC  2013     Section    CT HEAD ANGIO W AND WO IV CONTRAST  2023    CT HEAD ANGIO W AND WO IV CONTRAST 20[facility]    MR HEAD ANGIO WO IV CONTRAST  3/27/2018    MR HEAD ANGIO WO IV CONTRAST 3/27/2018 [facility]    MR HEAD ANGIO WO IV CONTRAST  2023    MR HEAD ANGIO WO IV CONTRAST 2023 [facility]    OTHER SURGICAL HISTORY  2022    Myringotomy    OTHER SURGICAL HISTORY  2022    Lipoma excision    OTHER SURGICAL HISTORY  2022    Tubal ligation        reports that she quit smoking about 24 years ago. Her smoking use included cigarettes. She has been exposed to tobacco smoke. She uses smokeless tobacco. She reports current alcohol use. She reports that she does not use drugs.    Review of Systems  Review of Systems   Respiratory:  Positive for cough.    All other systems reviewed and are negative.                                 Objective    Vitals:    24 0837   BP: 135/65   Pulse: 76   Temp: 35.8 °C (96.4 °F)   TempSrc: Temporal   SpO2: 97%   Weight: 129 kg (284 lb)   Height: 1.549 m (5' 1\")     No LMP recorded.    Physical Exam  Vitals reviewed.   Constitutional:       Appearance: Normal appearance.   HENT:      Right Ear: Hearing, tympanic membrane, ear canal and external ear normal.      Left Ear: Hearing, tympanic membrane, ear canal and external ear normal.      Nose: Nose normal.      Mouth/Throat:      Lips: Pink.      Mouth: Mucous membranes are moist.      Pharynx: Oropharynx is clear. Uvula midline.   Cardiovascular:      Rate and Rhythm: Normal rate and regular rhythm.      Pulses: Normal pulses.      Heart sounds: Normal heart sounds.   Pulmonary:      Effort: Pulmonary " effort is normal.      Breath sounds: Normal breath sounds.      Comments: Harsh bronchial cough   Neurological:      Mental Status: She is alert.         Procedures    Point of Care Test & Imaging Results from this visit  No results found for this visit on 12/29/24.   No results found.    Diagnostic study results (if any) were reviewed by SHEN Johnson.    Assessment/Plan   Allergies, medications, history, and pertinent labs/EKGs/Imaging reviewed by SHEN Johnson.     Medical Decision Making  Advised takign BS daily int he AM  Contact PCP as the prednisone will increase BS  Watch diet closely - limit carbs and increase protein  Start zpak for atypical uri coverage and the prednisone for the Cough and SOB    Orders and Diagnoses  Diagnoses and all orders for this visit:  Cough, unspecified type  -     XR chest 2 views; Future    Encounter Diagnosis   Name Primary?    Cough, unspecified type          Medical Admin Record      Patient disposition: Home    Electronically signed by SHEN Johnson  8:58 AM

## 2024-12-31 ENCOUNTER — APPOINTMENT (OUTPATIENT)
Dept: PRIMARY CARE | Facility: CLINIC | Age: 49
End: 2024-12-31
Payer: COMMERCIAL

## 2024-12-31 RX ORDER — BUMETANIDE 2 MG/1
TABLET ORAL
Qty: 60 TABLET | Refills: 0 | Status: SHIPPED | OUTPATIENT
Start: 2024-12-31

## 2024-12-31 RX ORDER — MONTELUKAST SODIUM 10 MG/1
10 TABLET ORAL DAILY
Qty: 30 TABLET | Refills: 0 | Status: SHIPPED | OUTPATIENT
Start: 2024-12-31

## 2024-12-31 RX ORDER — METFORMIN HYDROCHLORIDE 500 MG/1
2000 TABLET, EXTENDED RELEASE ORAL DAILY
Qty: 120 TABLET | Refills: 0 | Status: SHIPPED | OUTPATIENT
Start: 2024-12-31

## 2024-12-31 RX ORDER — ALBUTEROL SULFATE 90 UG/1
INHALANT RESPIRATORY (INHALATION)
Qty: 8.5 G | Refills: 0 | Status: SHIPPED | OUTPATIENT
Start: 2024-12-31

## 2024-12-31 RX ORDER — POTASSIUM CHLORIDE 20 MEQ/1
40 TABLET, EXTENDED RELEASE ORAL DAILY
Qty: 60 TABLET | Refills: 0 | Status: SHIPPED | OUTPATIENT
Start: 2024-12-31

## 2024-12-31 RX ORDER — LOSARTAN POTASSIUM 100 MG/1
100 TABLET ORAL DAILY
Qty: 30 TABLET | Refills: 0 | Status: SHIPPED | OUTPATIENT
Start: 2024-12-31

## 2024-12-31 RX ORDER — SEMAGLUTIDE 1.34 MG/ML
INJECTION, SOLUTION SUBCUTANEOUS
Qty: 3 ML | Refills: 0 | Status: SHIPPED | OUTPATIENT
Start: 2024-12-31

## 2025-01-07 ENCOUNTER — OFFICE VISIT (OUTPATIENT)
Dept: PRIMARY CARE | Facility: CLINIC | Age: 50
End: 2025-01-07
Payer: COMMERCIAL

## 2025-01-07 ENCOUNTER — APPOINTMENT (OUTPATIENT)
Dept: URGENT CARE | Age: 50
End: 2025-01-07
Payer: COMMERCIAL

## 2025-01-07 ENCOUNTER — TELEPHONE (OUTPATIENT)
Dept: CARDIOLOGY | Facility: CLINIC | Age: 50
End: 2025-01-07

## 2025-01-07 VITALS
HEIGHT: 61 IN | HEART RATE: 69 BPM | WEIGHT: 283 LBS | TEMPERATURE: 98 F | RESPIRATION RATE: 17 BRPM | DIASTOLIC BLOOD PRESSURE: 62 MMHG | BODY MASS INDEX: 53.43 KG/M2 | SYSTOLIC BLOOD PRESSURE: 112 MMHG | OXYGEN SATURATION: 97 %

## 2025-01-07 DIAGNOSIS — E66.01 CLASS 3 SEVERE OBESITY DUE TO EXCESS CALORIES WITH SERIOUS COMORBIDITY AND BODY MASS INDEX (BMI) OF 50.0 TO 59.9 IN ADULT: ICD-10-CM

## 2025-01-07 DIAGNOSIS — E11.9 TYPE 2 DIABETES MELLITUS WITHOUT COMPLICATION, WITHOUT LONG-TERM CURRENT USE OF INSULIN (MULTI): ICD-10-CM

## 2025-01-07 DIAGNOSIS — E78.2 MIXED HYPERLIPIDEMIA: ICD-10-CM

## 2025-01-07 DIAGNOSIS — D69.3 IDIOPATHIC THROMBOCYTOPENIC PURPURA (MULTI): ICD-10-CM

## 2025-01-07 DIAGNOSIS — R42 DIZZINESS: Primary | ICD-10-CM

## 2025-01-07 DIAGNOSIS — K11.20 PAROTITIS: ICD-10-CM

## 2025-01-07 DIAGNOSIS — I10 PRIMARY HYPERTENSION: ICD-10-CM

## 2025-01-07 DIAGNOSIS — E66.813 CLASS 3 SEVERE OBESITY DUE TO EXCESS CALORIES WITH SERIOUS COMORBIDITY AND BODY MASS INDEX (BMI) OF 50.0 TO 59.9 IN ADULT: ICD-10-CM

## 2025-01-07 LAB
ALBUMIN SERPL BCP-MCNC: 3.4 G/DL (ref 3.4–5)
ALP SERPL-CCNC: 89 U/L (ref 45–117)
ALT SERPL W P-5'-P-CCNC: 23 U/L (ref 16–63)
ANION GAP SERPL CALC-SCNC: 14 MMOL/L (ref 10–20)
APPEARANCE UR: CLEAR
AST SERPL W P-5'-P-CCNC: 15 U/L (ref 15–37)
BASOPHILS # BLD AUTO: 0.07 X10*3/UL (ref 0.1–1.6)
BASOPHILS NFR BLD AUTO: 0.73 % (ref 0–0.3)
BILIRUB SERPL-MCNC: 0.2 MG/DL (ref 0.2–1)
BILIRUB UR QL STRIP: NEGATIVE
BUN SERPL-MCNC: 12 MG/DL (ref 7–18)
CALCIUM SERPL-MCNC: 9.1 MG/DL (ref 8.5–10.1)
CHLORIDE SERPL-SCNC: 99 MMOL/L (ref 98–107)
CHOLEST SERPL-MCNC: 192 MG/DL (ref 0–199)
CHOLESTEROL/HDL RATIO: 3.8 (ref 4.2–7)
CO2 SERPL-SCNC: 27 MMOL/L (ref 21–32)
COLOR UR: YELLOW
CREAT SERPL-MCNC: 0.81 MG/DL (ref 0.6–1.1)
CREAT UR-MCNC: 35.6 MG/DL (ref 20–320)
EGFRCR SERPLBLD CKD-EPI 2021: 90 ML/MIN/1.73M*2
EOSINOPHIL # BLD AUTO: 0.34 X10*3/UL (ref 0.04–0.5)
EOSINOPHIL NFR BLD AUTO: 3.45 % (ref 0.7–7)
ERYTHROCYTE [DISTWIDTH] IN BLOOD BY AUTOMATED COUNT: 15 % (ref 11.5–14.5)
GLUCOSE SERPL-MCNC: 98 MG/DL (ref 74–100)
GLUCOSE UR STRIP-MCNC: NEGATIVE MG/DL
HBA1C MFR BLD: 6 %
HCT VFR BLD AUTO: 40.3 % (ref 36.6–46.6)
HDLC SERPL-MCNC: 51 MG/DL (ref 40–59)
HGB BLD-MCNC: 13.52 G/DL (ref 12–15.4)
HGB UR QL STRIP: ABNORMAL
IS PATIENT FASTING: YES
KETONES UR STRIP-MCNC: NEGATIVE MG/DL
LDLC SERPL DIRECT ASSAY-MCNC: 112 MG/DL (ref 0–100)
LEUKOCYTE ESTERASE UR QL STRIP: NEGATIVE
LYMPHOCYTES # BLD AUTO: 3.25 X10*3/UL (ref 0–6)
LYMPHOCYTES NFR BLD AUTO: 33.36 % (ref 20.5–51.1)
MCH RBC QN AUTO: 27.9 PG (ref 26–32)
MCHC RBC AUTO-ENTMCNC: 33.5 G/DL (ref 31–38)
MCV RBC AUTO: 83.1 FL (ref 80–96)
MICROALBUMIN UR-MCNC: <7 MG/L
MICROALBUMIN/CREAT UR: NORMAL MG/G{CREAT}
MONOCYTES # BLD AUTO: 0.66 X10*3/UL (ref 1.6–24.9)
MONOCYTES NFR BLD AUTO: 6.74 % (ref 1.7–9.3)
NEUTROPHILS # BLD AUTO: 5.43 X10*3/UL (ref 1.4–6.5)
NEUTROPHILS NFR BLD AUTO: 55.72 % (ref 42.2–75.2)
NITRITE UR QL STRIP: NEGATIVE
PH UR STRIP: 6.5 [PH]
PLATELET # BLD AUTO: 330.7 X10*3/UL (ref 150–450)
PMV BLD AUTO: 8.59 FL (ref 7.8–11)
POC FINGERSTICK BLOOD GLUCOSE: 110 MG/DL (ref 70–100)
POTASSIUM SERPL-SCNC: 3.5 MMOL/L (ref 3.5–5.1)
PROT SERPL-MCNC: 7.2 G/DL (ref 6.4–8.2)
PROT UR STRIP-MCNC: NEGATIVE MG/DL
RBC # BLD AUTO: 4.85 X10*6/UL (ref 3.9–5.3)
SODIUM SERPL-SCNC: 136 MMOL/L (ref 136–145)
SP GR UR STRIP.AUTO: 1.01
TRIGL SERPL-MCNC: 141 MG/DL
TSH SERPL-ACNC: 1.88 MIU/L (ref 0.44–3.98)
UROBILINOGEN UR STRIP-ACNC: 0.2 E.U./DL
WBC # BLD AUTO: 9.75 X10*3/UL (ref 4.5–10.5)

## 2025-01-07 PROCEDURE — 81003 URINALYSIS AUTO W/O SCOPE: CPT | Performed by: PEDIATRICS

## 2025-01-07 PROCEDURE — 99214 OFFICE O/P EST MOD 30 MIN: CPT | Performed by: PEDIATRICS

## 2025-01-07 PROCEDURE — 3049F LDL-C 100-129 MG/DL: CPT | Performed by: PEDIATRICS

## 2025-01-07 PROCEDURE — 80053 COMPREHEN METABOLIC PANEL: CPT | Performed by: PEDIATRICS

## 2025-01-07 PROCEDURE — 82043 UR ALBUMIN QUANTITATIVE: CPT

## 2025-01-07 PROCEDURE — 3044F HG A1C LEVEL LT 7.0%: CPT | Performed by: PEDIATRICS

## 2025-01-07 PROCEDURE — 3062F POS MACROALBUMINURIA REV: CPT | Performed by: PEDIATRICS

## 2025-01-07 PROCEDURE — 82570 ASSAY OF URINE CREATININE: CPT

## 2025-01-07 PROCEDURE — 80061 LIPID PANEL: CPT | Performed by: PEDIATRICS

## 2025-01-07 PROCEDURE — 3074F SYST BP LT 130 MM HG: CPT | Performed by: PEDIATRICS

## 2025-01-07 PROCEDURE — G2211 COMPLEX E/M VISIT ADD ON: HCPCS | Performed by: PEDIATRICS

## 2025-01-07 PROCEDURE — 82962 GLUCOSE BLOOD TEST: CPT | Performed by: PEDIATRICS

## 2025-01-07 PROCEDURE — 85025 COMPLETE CBC W/AUTO DIFF WBC: CPT | Performed by: PEDIATRICS

## 2025-01-07 PROCEDURE — 4010F ACE/ARB THERAPY RXD/TAKEN: CPT | Performed by: PEDIATRICS

## 2025-01-07 PROCEDURE — 3008F BODY MASS INDEX DOCD: CPT | Performed by: PEDIATRICS

## 2025-01-07 PROCEDURE — 84443 ASSAY THYROID STIM HORMONE: CPT | Performed by: PEDIATRICS

## 2025-01-07 PROCEDURE — 3078F DIAST BP <80 MM HG: CPT | Performed by: PEDIATRICS

## 2025-01-07 PROCEDURE — 83036 HEMOGLOBIN GLYCOSYLATED A1C: CPT | Performed by: PEDIATRICS

## 2025-01-07 RX ORDER — BUMETANIDE 2 MG/1
TABLET ORAL
Qty: 30 TABLET | Refills: 0 | OUTPATIENT
Start: 2025-01-07

## 2025-01-07 RX ORDER — MOXIFLOXACIN HYDROCHLORIDE 400 MG/1
400 TABLET ORAL DAILY
Qty: 10 TABLET | Refills: 0 | Status: SHIPPED | OUTPATIENT
Start: 2025-01-07 | End: 2025-01-18

## 2025-01-07 ASSESSMENT — PAIN SCALES - GENERAL: PAINLEVEL_OUTOF10: 0-NO PAIN

## 2025-01-07 ASSESSMENT — PATIENT HEALTH QUESTIONNAIRE - PHQ9
2. FEELING DOWN, DEPRESSED OR HOPELESS: NOT AT ALL
SUM OF ALL RESPONSES TO PHQ9 QUESTIONS 1 AND 2: 0
1. LITTLE INTEREST OR PLEASURE IN DOING THINGS: NOT AT ALL

## 2025-01-07 NOTE — PROGRESS NOTES
"Subjective   Patient ID: Leslee Rushing is a 49 y.o. female who presents for Dizziness.    HPI   Has been in bed for 4 days; things have been spinning; denies headache; tried migraine maxalt without benefit; Drank coffee with benefit; was ok 2 days ago after drinking one cup of coffee; Yesterday the vertigo came back and had to be in bed all day; coffee did not help her yesterday; Meclizine has not helped; Patient has been on Qulipta and Topamax to prevent migraines; last HA was Friday.   Last took Losartan at 1 pm; took verapamil at 10 pm  Review of Systems  Has trouble hearing from left side; hurting behind left ear for 2 days  Objective   /62 (Patient Position: Standing) Comment: after 2 glasses of water, BP normalized  Pulse 69   Temp 36.7 °C (98 °F) (Temporal)   Resp 17   Ht 1.549 m (5' 1\")   Wt 128 kg (283 lb)   SpO2 97%   BMI 53.47 kg/m²     Physical Exam  EOMI; Tms normal; no nystagmus  Lungs clear  Heart RRR  Able to ambulate  Parotid tender left side    Assessment/Plan   Problem List Items Addressed This Visit             ICD-10-CM    Diabetes mellitus (Multi) E11.9    Relevant Orders    POCT fingerstick glucose manually resulted (Completed)    Albumin-Creatinine Ratio, Urine Random    Hemoglobin A1C    Hyperlipidemia E78.5    Relevant Orders    Comprehensive Metabolic Panel    Lipid Panel    Thyroid Stimulating Hormone    Idiopathic thrombocytopenic purpura (Multi) D69.3    Relevant Orders    CBC w/5 Part Differential, [facility]    Hypertension I10    Class 3 severe obesity due to excess calories with serious comorbidity and body mass index (BMI) of 50.0 to 59.9 in adult E66.813, Z68.43, E66.01    Dizziness - Primary R42    Relevant Orders    POCT UA (Automated) docked device    POCT fingerstick glucose manually resulted (Completed)    Parotitis K11.20    Relevant Orders    CBC w/5 Part Differential, [facility]          "

## 2025-01-07 NOTE — TELEPHONE ENCOUNTER
Pt had called earlier stating that she seen her PCP, Dr Sabillon, today and her BP was low and she was feeling dizzy. Pt reported that her BP was 90/40 standing. Pt stated that she drank 2 glasses of water and her BP increased to 112/62. Pt stated that Dr Sabillon decreased her Bumex to one 2 mg tablet every other day. Pt wanted to know if Dr Landa would like to decrease any more of her medications at this time. This nurse returned pt's call and informed her, per Dr Landa, that the change to the Bumex that Dr Sabillon made today was enough for now and that she should use the Meclizine sparingly. Pt instructed to take and record her BP readings twice daily and call the office back in a couple of days if she does not have any improvement in symptoms. Pt verbalized understanding.

## 2025-01-07 NOTE — PATIENT INSTRUCTIONS
For now please hold verapamil, losartan and bumex until I hear from Dr Landa-->he said to cut Bumex to every other day  Drink lots of water.  See ENT about parotid gland infection

## 2025-01-08 DIAGNOSIS — K11.20 PAROTITIS: ICD-10-CM

## 2025-01-10 RX ORDER — MOXIFLOXACIN HYDROCHLORIDE 400 MG/1
TABLET ORAL
Qty: 10 TABLET | Refills: 10 | OUTPATIENT
Start: 2025-01-10

## 2025-01-17 ENCOUNTER — APPOINTMENT (OUTPATIENT)
Dept: PRIMARY CARE | Facility: CLINIC | Age: 50
End: 2025-01-17
Payer: COMMERCIAL

## 2025-01-27 ENCOUNTER — APPOINTMENT (OUTPATIENT)
Dept: PRIMARY CARE | Facility: CLINIC | Age: 50
End: 2025-01-27
Payer: COMMERCIAL

## 2025-01-28 ENCOUNTER — APPOINTMENT (OUTPATIENT)
Dept: PRIMARY CARE | Facility: CLINIC | Age: 50
End: 2025-01-28
Payer: COMMERCIAL

## 2025-01-28 DIAGNOSIS — E78.5 HYPERLIPIDEMIA, UNSPECIFIED HYPERLIPIDEMIA TYPE: ICD-10-CM

## 2025-01-28 DIAGNOSIS — E11.9 TYPE 2 DIABETES MELLITUS WITHOUT COMPLICATION, WITHOUT LONG-TERM CURRENT USE OF INSULIN (MULTI): ICD-10-CM

## 2025-01-28 DIAGNOSIS — J45.909 ASTHMA, UNSPECIFIED ASTHMA SEVERITY, UNSPECIFIED WHETHER COMPLICATED, UNSPECIFIED WHETHER PERSISTENT (HHS-HCC): ICD-10-CM

## 2025-01-28 DIAGNOSIS — I51.89 DIASTOLIC DYSFUNCTION: ICD-10-CM

## 2025-01-29 RX ORDER — MONTELUKAST SODIUM 10 MG/1
10 TABLET ORAL DAILY
Qty: 30 TABLET | Refills: 1 | Status: SHIPPED | OUTPATIENT
Start: 2025-01-29

## 2025-01-29 RX ORDER — METFORMIN HYDROCHLORIDE 500 MG/1
2000 TABLET, EXTENDED RELEASE ORAL DAILY
Qty: 120 TABLET | Refills: 1 | Status: SHIPPED | OUTPATIENT
Start: 2025-01-29

## 2025-01-29 RX ORDER — ALBUTEROL SULFATE 90 UG/1
INHALANT RESPIRATORY (INHALATION)
Qty: 8.5 G | Refills: 1 | Status: SHIPPED | OUTPATIENT
Start: 2025-01-29

## 2025-01-29 RX ORDER — ATORVASTATIN CALCIUM 10 MG/1
10 TABLET, FILM COATED ORAL NIGHTLY
Qty: 30 TABLET | Refills: 1 | Status: SHIPPED | OUTPATIENT
Start: 2025-01-29

## 2025-01-29 RX ORDER — SEMAGLUTIDE 1.34 MG/ML
1 INJECTION, SOLUTION SUBCUTANEOUS
Qty: 3 ML | Refills: 1 | Status: SHIPPED | OUTPATIENT
Start: 2025-02-02

## 2025-01-30 DIAGNOSIS — R42 DIZZINESS: ICD-10-CM

## 2025-01-30 DIAGNOSIS — E11.42 DIABETIC POLYNEUROPATHY ASSOCIATED WITH TYPE 2 DIABETES MELLITUS (MULTI): ICD-10-CM

## 2025-01-30 DIAGNOSIS — I51.89 DIASTOLIC DYSFUNCTION: ICD-10-CM

## 2025-01-30 DIAGNOSIS — I10 HYPERTENSION, UNSPECIFIED TYPE: ICD-10-CM

## 2025-01-31 DIAGNOSIS — I51.89 DIASTOLIC DYSFUNCTION: ICD-10-CM

## 2025-01-31 DIAGNOSIS — I10 PRIMARY HYPERTENSION: Primary | ICD-10-CM

## 2025-01-31 RX ORDER — POTASSIUM CHLORIDE 20 MEQ/1
40 TABLET, EXTENDED RELEASE ORAL DAILY
Qty: 60 TABLET | Refills: 0 | OUTPATIENT
Start: 2025-01-31

## 2025-01-31 RX ORDER — POTASSIUM CHLORIDE 20 MEQ/1
TABLET, EXTENDED RELEASE ORAL
Qty: 60 TABLET | Refills: 0 | Status: SHIPPED | OUTPATIENT
Start: 2025-01-31

## 2025-01-31 RX ORDER — BUMETANIDE 2 MG/1
TABLET ORAL
Qty: 30 TABLET | Refills: 0 | Status: SHIPPED | OUTPATIENT
Start: 2025-01-31

## 2025-01-31 RX ORDER — GABAPENTIN 100 MG/1
100 CAPSULE ORAL 4 TIMES DAILY
Qty: 120 CAPSULE | Refills: 0 | Status: SHIPPED | OUTPATIENT
Start: 2025-01-31

## 2025-01-31 RX ORDER — LOSARTAN POTASSIUM 100 MG/1
100 TABLET ORAL DAILY
Qty: 30 TABLET | Refills: 0 | Status: SHIPPED | OUTPATIENT
Start: 2025-01-31

## 2025-02-03 ENCOUNTER — APPOINTMENT (OUTPATIENT)
Dept: PRIMARY CARE | Facility: CLINIC | Age: 50
End: 2025-02-03
Payer: COMMERCIAL

## 2025-02-03 DIAGNOSIS — G43.909 MIGRAINE WITHOUT STATUS MIGRAINOSUS, NOT INTRACTABLE, UNSPECIFIED MIGRAINE TYPE: ICD-10-CM

## 2025-02-03 DIAGNOSIS — R42 DIZZINESS: ICD-10-CM

## 2025-02-04 RX ORDER — TOPIRAMATE 100 MG/1
100 TABLET, FILM COATED ORAL 2 TIMES DAILY
Qty: 180 TABLET | Refills: 1 | Status: SHIPPED | OUTPATIENT
Start: 2025-02-04

## 2025-02-05 RX ORDER — BUMETANIDE 2 MG/1
TABLET ORAL
Qty: 30 TABLET | Refills: 0 | OUTPATIENT
Start: 2025-02-05

## 2025-02-07 ENCOUNTER — OFFICE VISIT (OUTPATIENT)
Dept: PRIMARY CARE | Facility: CLINIC | Age: 50
End: 2025-02-07
Payer: COMMERCIAL

## 2025-02-07 VITALS
TEMPERATURE: 97.9 F | BODY MASS INDEX: 54.75 KG/M2 | HEIGHT: 61 IN | SYSTOLIC BLOOD PRESSURE: 129 MMHG | WEIGHT: 290 LBS | HEART RATE: 66 BPM | DIASTOLIC BLOOD PRESSURE: 67 MMHG | OXYGEN SATURATION: 97 %

## 2025-02-07 DIAGNOSIS — Z00.00 MEDICARE ANNUAL WELLNESS VISIT, SUBSEQUENT: Primary | ICD-10-CM

## 2025-02-07 DIAGNOSIS — E55.9 VITAMIN D DEFICIENCY: ICD-10-CM

## 2025-02-07 DIAGNOSIS — E11.9 TYPE 2 DIABETES MELLITUS WITHOUT COMPLICATION, WITHOUT LONG-TERM CURRENT USE OF INSULIN (MULTI): ICD-10-CM

## 2025-02-07 DIAGNOSIS — J45.20 MILD INTERMITTENT ASTHMA WITHOUT COMPLICATION (HHS-HCC): ICD-10-CM

## 2025-02-07 DIAGNOSIS — E78.2 MIXED HYPERLIPIDEMIA: ICD-10-CM

## 2025-02-07 DIAGNOSIS — G43.909 MIGRAINE WITHOUT STATUS MIGRAINOSUS, NOT INTRACTABLE, UNSPECIFIED MIGRAINE TYPE: ICD-10-CM

## 2025-02-07 DIAGNOSIS — F31.9 BIPOLAR AFFECTIVE DISORDER, REMISSION STATUS UNSPECIFIED (MULTI): ICD-10-CM

## 2025-02-07 DIAGNOSIS — E87.6 HYPOKALEMIA: ICD-10-CM

## 2025-02-07 DIAGNOSIS — I10 PRIMARY HYPERTENSION: ICD-10-CM

## 2025-02-07 DIAGNOSIS — D22.9 MELANOCYTIC NEVUS, UNSPECIFIED LOCATION: ICD-10-CM

## 2025-02-07 DIAGNOSIS — G47.33 OBSTRUCTIVE SLEEP APNEA: ICD-10-CM

## 2025-02-07 DIAGNOSIS — D69.3 IDIOPATHIC THROMBOCYTOPENIC PURPURA (MULTI): ICD-10-CM

## 2025-02-07 DIAGNOSIS — K21.9 GASTROESOPHAGEAL REFLUX DISEASE, UNSPECIFIED WHETHER ESOPHAGITIS PRESENT: ICD-10-CM

## 2025-02-07 DIAGNOSIS — I35.0 SEVERE AORTIC STENOSIS BY PRIOR ECHOCARDIOGRAM: Chronic | ICD-10-CM

## 2025-02-07 DIAGNOSIS — L30.9 DERMATITIS: ICD-10-CM

## 2025-02-07 DIAGNOSIS — E66.813 CLASS 3 SEVERE OBESITY DUE TO EXCESS CALORIES WITH SERIOUS COMORBIDITY AND BODY MASS INDEX (BMI) OF 50.0 TO 59.9 IN ADULT: ICD-10-CM

## 2025-02-07 DIAGNOSIS — E83.42 HYPOMAGNESEMIA: ICD-10-CM

## 2025-02-07 DIAGNOSIS — E66.01 CLASS 3 SEVERE OBESITY DUE TO EXCESS CALORIES WITH SERIOUS COMORBIDITY AND BODY MASS INDEX (BMI) OF 50.0 TO 59.9 IN ADULT: ICD-10-CM

## 2025-02-07 DIAGNOSIS — I67.1 ANEURYSM, CAROTID ARTERY, INTERNAL (HHS-HCC): ICD-10-CM

## 2025-02-07 DIAGNOSIS — E11.42 DIABETIC POLYNEUROPATHY ASSOCIATED WITH TYPE 2 DIABETES MELLITUS (MULTI): ICD-10-CM

## 2025-02-07 DIAGNOSIS — K76.0 FATTY LIVER: ICD-10-CM

## 2025-02-07 PROBLEM — H10.30 ACUTE CONJUNCTIVITIS: Status: RESOLVED | Noted: 2024-09-26 | Resolved: 2025-02-07

## 2025-02-07 PROBLEM — D69.6 LOW PLATELET COUNT (CMS-HCC): Status: RESOLVED | Noted: 2022-12-12 | Resolved: 2025-02-07

## 2025-02-07 PROBLEM — K11.20 PAROTITIS: Status: RESOLVED | Noted: 2025-01-07 | Resolved: 2025-02-07

## 2025-02-07 PROBLEM — R42 DIZZINESS: Status: RESOLVED | Noted: 2023-09-07 | Resolved: 2025-02-07

## 2025-02-07 PROBLEM — Z86.79 HISTORY OF HYPERTENSION: Status: RESOLVED | Noted: 2024-09-26 | Resolved: 2025-02-07

## 2025-02-07 PROBLEM — S80.00XA CONTUSION OF KNEE: Status: RESOLVED | Noted: 2024-09-26 | Resolved: 2025-02-07

## 2025-02-07 PROCEDURE — 3049F LDL-C 100-129 MG/DL: CPT | Performed by: PEDIATRICS

## 2025-02-07 PROCEDURE — 99214 OFFICE O/P EST MOD 30 MIN: CPT | Performed by: PEDIATRICS

## 2025-02-07 PROCEDURE — 3044F HG A1C LEVEL LT 7.0%: CPT | Performed by: PEDIATRICS

## 2025-02-07 PROCEDURE — 3008F BODY MASS INDEX DOCD: CPT | Performed by: PEDIATRICS

## 2025-02-07 PROCEDURE — 3062F POS MACROALBUMINURIA REV: CPT | Performed by: PEDIATRICS

## 2025-02-07 PROCEDURE — 90656 IIV3 VACC NO PRSV 0.5 ML IM: CPT | Performed by: PEDIATRICS

## 2025-02-07 PROCEDURE — G0008 ADMIN INFLUENZA VIRUS VAC: HCPCS | Performed by: PEDIATRICS

## 2025-02-07 PROCEDURE — 3078F DIAST BP <80 MM HG: CPT | Performed by: PEDIATRICS

## 2025-02-07 PROCEDURE — 4010F ACE/ARB THERAPY RXD/TAKEN: CPT | Performed by: PEDIATRICS

## 2025-02-07 PROCEDURE — G0439 PPPS, SUBSEQ VISIT: HCPCS | Performed by: PEDIATRICS

## 2025-02-07 PROCEDURE — 3074F SYST BP LT 130 MM HG: CPT | Performed by: PEDIATRICS

## 2025-02-07 RX ORDER — VIT C/E/ZN/COPPR/LUTEIN/ZEAXAN 250MG-90MG
25 CAPSULE ORAL DAILY
Qty: 30 CAPSULE | Refills: 11 | Status: SHIPPED | OUTPATIENT
Start: 2025-02-07 | End: 2026-02-07

## 2025-02-07 RX ORDER — HYDROCORTISONE 10 MG/ML
LOTION TOPICAL 2 TIMES DAILY
Qty: 60 ML | Refills: 2 | Status: SHIPPED | OUTPATIENT
Start: 2025-02-07

## 2025-02-07 ASSESSMENT — ENCOUNTER SYMPTOMS
FREQUENCY: 1
PALPITATIONS: 1
DIZZINESS: 0
VOMITING: 0
NAUSEA: 0
LIGHT-HEADEDNESS: 0
ABDOMINAL PAIN: 0
DIARRHEA: 1
FEVER: 0
DIFFICULTY URINATING: 0
HEADACHES: 0
NERVOUS/ANXIOUS: 1
MUSCULOSKELETAL NEGATIVE: 1
SHORTNESS OF BREATH: 1
DYSURIA: 0
FATIGUE: 0
CONSTIPATION: 0
CHEST TIGHTNESS: 1
COUGH: 1

## 2025-02-07 ASSESSMENT — ACTIVITIES OF DAILY LIVING (ADL)
DOING_HOUSEWORK: NEEDS ASSISTANCE
DRESSING: INDEPENDENT
MANAGING_FINANCES: INDEPENDENT
BATHING: INDEPENDENT
TAKING_MEDICATION: INDEPENDENT
GROCERY_SHOPPING: INDEPENDENT

## 2025-02-07 NOTE — PATIENT INSTRUCTIONS
All Free and Clear  Unscented Dove  Avoid chips and desserts  Fruit sorbet  Fibroscan --  See GYN  Return in 6 months

## 2025-02-07 NOTE — PROGRESS NOTES
"Subjective   Patient ID: Leslee Rushing is a 49 y.o. female who presents for .    HPI   Patient states she has rash on her back, she noticed it a few days ago when trying to itch. Daughter noticed red bumps  throughout her entire back. She also noticed a red blister behind the left knee.  Denies recent illness, fevers, no new laundry detergent/soaps.     Vapes twice a week  Review of Systems   Constitutional:  Negative for fatigue and fever.   HENT: Negative.     Respiratory:  Positive for cough, chest tightness and shortness of breath.    Cardiovascular:  Positive for chest pain and palpitations.   Gastrointestinal:  Positive for diarrhea. Negative for abdominal pain, constipation, nausea and vomiting.   Genitourinary:  Positive for frequency. Negative for difficulty urinating, dysuria and urgency.   Musculoskeletal: Negative.    Skin:  Positive for rash.   Neurological:  Negative for dizziness, light-headedness and headaches.   Psychiatric/Behavioral:  The patient is nervous/anxious.        Objective   /67   Pulse 66   Temp 36.6 °C (97.9 °F)   Ht 1.549 m (5' 1\")   Wt 132 kg (290 lb)   SpO2 97%   BMI 54.80 kg/m²     Physical Exam  Dry pink rough skin with excoriated pimples on back  Lungs clear  Heart holosystolic murmur which radiates to neck  Abd soft  Sensation intact  Assessment/Plan   Problem List Items Addressed This Visit             ICD-10-CM    Bipolar disorder F31.9     Not currently taking meds. Needs to reschedule apt with psychiatrists         Diabetes mellitus (Multi) E11.9     A1C 6.0 01/2025         Diabetic neuropathy (Multi) E11.40     Increased in numbness and tingling in hands, still taking gabapentin         Hyperlipidemia E78.5    Idiopathic thrombocytopenic purpura (Multi) D69.3     Last platelet normal         Migraine headache G43.909     Maxalt helps          Hypertension I10     HTN well controlled         Class 3 severe obesity due to excess calories with serious " comorbidity and body mass index (BMI) of 50.0 to 59.9 in adult E66.813, Z68.43, E66.01    Obstructive sleep apnea G47.33     Uses CPAP         Mild intermittent asthma without complication ([facility]) J45.20     No issues         Aneurysm, carotid artery, internal ([facility]) I67.1    Hypokalemia E87.6    Hypomagnesemia E83.42    Severe aortic stenosis by prior echocardiogram (Chronic) I35.0     Sees cardiology in March 2025         Vitamin D deficiency E55.9     Vitamin D 13         Relevant Medications    cholecalciferol (Vitamin D3) 25 MCG (1000 UT) capsule    Fatty liver K76.0    Gastroesophageal reflux disease K21.9     Well-controlled on PPI         Melanocytic nevus D22.9     Was told that all was fine          Other Visit Diagnoses         Codes    Medicare annual wellness visit, subsequent    -  Primary Z00.00    Dermatitis     L30.9    Relevant Medications    hydrocortisone 1 % lotion

## 2025-02-12 DIAGNOSIS — E11.9 TYPE 2 DIABETES MELLITUS WITHOUT COMPLICATION, WITHOUT LONG-TERM CURRENT USE OF INSULIN (MULTI): ICD-10-CM

## 2025-02-15 RX ORDER — SEMAGLUTIDE 1.34 MG/ML
1 INJECTION, SOLUTION SUBCUTANEOUS
Qty: 3 ML | Refills: 5 | Status: SHIPPED | OUTPATIENT
Start: 2025-02-16

## 2025-02-20 ENCOUNTER — APPOINTMENT (OUTPATIENT)
Dept: PRIMARY CARE | Facility: CLINIC | Age: 50
End: 2025-02-20
Payer: COMMERCIAL

## 2025-02-26 DIAGNOSIS — J30.89 ALLERGIC RHINITIS DUE TO OTHER ALLERGIC TRIGGER, UNSPECIFIED SEASONALITY: ICD-10-CM

## 2025-02-27 DIAGNOSIS — E11.42 DIABETIC POLYNEUROPATHY ASSOCIATED WITH TYPE 2 DIABETES MELLITUS (MULTI): ICD-10-CM

## 2025-02-27 DIAGNOSIS — I10 HYPERTENSION, UNSPECIFIED TYPE: ICD-10-CM

## 2025-02-27 RX ORDER — LORATADINE 10 MG/1
10 TABLET ORAL DAILY
Qty: 90 TABLET | Refills: 1 | Status: SHIPPED | OUTPATIENT
Start: 2025-02-27

## 2025-02-28 DIAGNOSIS — G43.909 MIGRAINE WITHOUT STATUS MIGRAINOSUS, NOT INTRACTABLE, UNSPECIFIED MIGRAINE TYPE: ICD-10-CM

## 2025-02-28 RX ORDER — LOSARTAN POTASSIUM 100 MG/1
100 TABLET ORAL DAILY
Qty: 30 TABLET | Refills: 3 | Status: SHIPPED | OUTPATIENT
Start: 2025-02-28

## 2025-02-28 RX ORDER — GABAPENTIN 100 MG/1
100 CAPSULE ORAL 4 TIMES DAILY
Qty: 120 CAPSULE | Refills: 3 | Status: SHIPPED | OUTPATIENT
Start: 2025-02-28

## 2025-03-04 RX ORDER — ATOGEPANT 60 MG/1
60 TABLET ORAL DAILY
Qty: 30 TABLET | Refills: 2 | OUTPATIENT
Start: 2025-03-04

## 2025-03-10 DIAGNOSIS — R42 DIZZINESS: ICD-10-CM

## 2025-03-11 ENCOUNTER — APPOINTMENT (OUTPATIENT)
Dept: CARDIOLOGY | Facility: CLINIC | Age: 50
End: 2025-03-11
Payer: COMMERCIAL

## 2025-03-11 RX ORDER — BUMETANIDE 2 MG/1
TABLET ORAL
Qty: 15 TABLET | Refills: 0 | Status: SHIPPED | OUTPATIENT
Start: 2025-03-11

## 2025-03-12 ENCOUNTER — TELEPHONE (OUTPATIENT)
Dept: PRIMARY CARE | Facility: CLINIC | Age: 50
End: 2025-03-12

## 2025-03-12 ENCOUNTER — OFFICE VISIT (OUTPATIENT)
Dept: PRIMARY CARE | Facility: CLINIC | Age: 50
End: 2025-03-12
Payer: MEDICAID

## 2025-03-12 VITALS
OXYGEN SATURATION: 98 % | BODY MASS INDEX: 53.24 KG/M2 | HEART RATE: 75 BPM | SYSTOLIC BLOOD PRESSURE: 115 MMHG | DIASTOLIC BLOOD PRESSURE: 72 MMHG | HEIGHT: 61 IN | WEIGHT: 282 LBS | TEMPERATURE: 97.9 F

## 2025-03-12 DIAGNOSIS — J02.9 PHARYNGITIS, UNSPECIFIED ETIOLOGY: Primary | ICD-10-CM

## 2025-03-12 LAB — POC RAPID STREP: NEGATIVE

## 2025-03-12 PROCEDURE — 3044F HG A1C LEVEL LT 7.0%: CPT | Performed by: PEDIATRICS

## 2025-03-12 PROCEDURE — 3078F DIAST BP <80 MM HG: CPT | Performed by: PEDIATRICS

## 2025-03-12 PROCEDURE — 3008F BODY MASS INDEX DOCD: CPT | Performed by: PEDIATRICS

## 2025-03-12 PROCEDURE — 4010F ACE/ARB THERAPY RXD/TAKEN: CPT | Performed by: PEDIATRICS

## 2025-03-12 PROCEDURE — 99212 OFFICE O/P EST SF 10 MIN: CPT | Performed by: PEDIATRICS

## 2025-03-12 PROCEDURE — 3062F POS MACROALBUMINURIA REV: CPT | Performed by: PEDIATRICS

## 2025-03-12 PROCEDURE — 3074F SYST BP LT 130 MM HG: CPT | Performed by: PEDIATRICS

## 2025-03-12 PROCEDURE — 87880 STREP A ASSAY W/OPTIC: CPT | Performed by: PEDIATRICS

## 2025-03-12 PROCEDURE — 3049F LDL-C 100-129 MG/DL: CPT | Performed by: PEDIATRICS

## 2025-03-12 ASSESSMENT — PAIN SCALES - GENERAL: PAINLEVEL_OUTOF10: 6

## 2025-03-12 ASSESSMENT — PATIENT HEALTH QUESTIONNAIRE - PHQ9
1. LITTLE INTEREST OR PLEASURE IN DOING THINGS: NOT AT ALL
2. FEELING DOWN, DEPRESSED OR HOPELESS: NOT AT ALL
SUM OF ALL RESPONSES TO PHQ9 QUESTIONS 1 AND 2: 0

## 2025-03-12 NOTE — PROGRESS NOTES
"Subjective   Patient ID: Leslee Rushing is a 49 y.o. female who presents for Sore Throat and Cough.    HPI   Covid test negative; mild cough for 2 days; sore throat for 5 days. No fever.  Review of Systems    Objective   /72   Pulse 75   Temp 36.6 °C (97.9 °F)   Ht 1.549 m (5' 1\")   Wt 128 kg (282 lb)   SpO2 98%   BMI 53.28 kg/m²     Physical Exam  Throat clear  Lungs clear  Assessment/Plan          "

## 2025-03-12 NOTE — TELEPHONE ENCOUNTER
Patient sent an appointment request stating her throat has been hurting for five days. She stated she is covid negative and has tested 3 times per the message. She is requesting to be tested for strep. Would you like for her to be put on the schedule?

## 2025-03-14 LAB — S PYO THROAT QL CULT: NORMAL

## 2025-03-19 ENCOUNTER — TELEPHONE (OUTPATIENT)
Dept: PRIMARY CARE | Facility: CLINIC | Age: 50
End: 2025-03-19
Payer: MEDICAID

## 2025-03-19 DIAGNOSIS — J40 BRONCHITIS: Primary | ICD-10-CM

## 2025-03-19 RX ORDER — AZITHROMYCIN 250 MG/1
TABLET, FILM COATED ORAL
Qty: 6 TABLET | Refills: 0 | Status: SHIPPED | OUTPATIENT
Start: 2025-03-19 | End: 2025-03-24

## 2025-03-19 NOTE — TELEPHONE ENCOUNTER
Patient is calling advising her sickness has worsened and ana is concerned it has traveled to her chest. She stated she has been using her inhaler for the last 5 days. She can be reached at [phone]

## 2025-03-26 DIAGNOSIS — E11.9 TYPE 2 DIABETES MELLITUS WITHOUT COMPLICATION, WITHOUT LONG-TERM CURRENT USE OF INSULIN: ICD-10-CM

## 2025-03-26 DIAGNOSIS — E78.5 HYPERLIPIDEMIA, UNSPECIFIED HYPERLIPIDEMIA TYPE: ICD-10-CM

## 2025-03-27 DIAGNOSIS — I51.89 DIASTOLIC DYSFUNCTION: ICD-10-CM

## 2025-03-27 DIAGNOSIS — J45.909 ASTHMA, UNSPECIFIED ASTHMA SEVERITY, UNSPECIFIED WHETHER COMPLICATED, UNSPECIFIED WHETHER PERSISTENT (HHS-HCC): ICD-10-CM

## 2025-03-28 RX ORDER — METFORMIN HYDROCHLORIDE 500 MG/1
TABLET, EXTENDED RELEASE ORAL
Qty: 120 TABLET | Refills: 4 | Status: SHIPPED | OUTPATIENT
Start: 2025-03-28

## 2025-03-28 RX ORDER — ATORVASTATIN CALCIUM 10 MG/1
10 TABLET, FILM COATED ORAL NIGHTLY
Qty: 30 TABLET | Refills: 4 | Status: SHIPPED | OUTPATIENT
Start: 2025-03-28

## 2025-03-29 RX ORDER — POTASSIUM CHLORIDE 20 MEQ/1
TABLET, EXTENDED RELEASE ORAL
Qty: 60 TABLET | Refills: 5 | Status: SHIPPED | OUTPATIENT
Start: 2025-03-29

## 2025-03-29 RX ORDER — MONTELUKAST SODIUM 10 MG/1
10 TABLET ORAL DAILY
Qty: 30 TABLET | Refills: 5 | Status: SHIPPED | OUTPATIENT
Start: 2025-03-29

## 2025-03-29 RX ORDER — ALBUTEROL SULFATE 90 UG/1
INHALANT RESPIRATORY (INHALATION)
Qty: 8.5 G | Refills: 10 | Status: SHIPPED | OUTPATIENT
Start: 2025-03-29

## 2025-03-30 DIAGNOSIS — R42 DIZZINESS: ICD-10-CM

## 2025-04-01 RX ORDER — BUMETANIDE 2 MG/1
TABLET ORAL
Qty: 15 TABLET | Refills: 5 | Status: SHIPPED | OUTPATIENT
Start: 2025-04-01

## 2025-04-06 DIAGNOSIS — R42 DIZZINESS: ICD-10-CM

## 2025-04-07 RX ORDER — BUMETANIDE 2 MG/1
TABLET ORAL
Qty: 15 TABLET | Refills: 11 | OUTPATIENT
Start: 2025-04-07

## 2025-04-24 ENCOUNTER — TELEPHONE (OUTPATIENT)
Dept: PRIMARY CARE | Facility: CLINIC | Age: 50
End: 2025-04-24
Payer: MEDICARE

## 2025-04-24 DIAGNOSIS — H10.13 ALLERGIC CONJUNCTIVITIS OF BOTH EYES: Primary | ICD-10-CM

## 2025-04-24 DIAGNOSIS — J30.89 ALLERGIC RHINITIS DUE TO OTHER ALLERGIC TRIGGER, UNSPECIFIED SEASONALITY: ICD-10-CM

## 2025-04-24 RX ORDER — KETOTIFEN FUMARATE 0.35 MG/ML
1 SOLUTION/ DROPS OPHTHALMIC 2 TIMES DAILY
Qty: 10 ML | Refills: 11 | Status: SHIPPED | OUTPATIENT
Start: 2025-04-24

## 2025-04-24 NOTE — TELEPHONE ENCOUNTER
Dr. Sabillon Pt. left a voicemail message requesting eye drops. Pt. stated eye are itchy and burning for 2-3 weeks send drops to [facility] listed in chart. If with questions call her back at [phone].

## 2025-04-25 RX ORDER — LORATADINE 10 MG/1
10 TABLET ORAL DAILY
Qty: 90 TABLET | Refills: 1 | Status: SHIPPED | OUTPATIENT
Start: 2025-04-25

## 2025-06-25 DIAGNOSIS — J30.89 ALLERGIC RHINITIS DUE TO OTHER ALLERGIC TRIGGER, UNSPECIFIED SEASONALITY: ICD-10-CM

## 2025-06-25 DIAGNOSIS — E11.42 DIABETIC POLYNEUROPATHY ASSOCIATED WITH TYPE 2 DIABETES MELLITUS: ICD-10-CM

## 2025-06-26 DIAGNOSIS — I10 HYPERTENSION, UNSPECIFIED TYPE: ICD-10-CM

## 2025-06-27 RX ORDER — GABAPENTIN 100 MG/1
100 CAPSULE ORAL 4 TIMES DAILY
Qty: 120 CAPSULE | Refills: 2 | Status: SHIPPED | OUTPATIENT
Start: 2025-06-27

## 2025-06-27 RX ORDER — FLUTICASONE PROPIONATE 50 MCG
2 SPRAY, SUSPENSION (ML) NASAL DAILY
Qty: 16 G | Refills: 11 | Status: SHIPPED | OUTPATIENT
Start: 2025-06-27

## 2025-06-27 RX ORDER — LOSARTAN POTASSIUM 100 MG/1
100 TABLET ORAL DAILY
Qty: 30 TABLET | Refills: 1 | Status: SHIPPED | OUTPATIENT
Start: 2025-06-27

## 2025-07-24 DIAGNOSIS — I10 PRIMARY HYPERTENSION: Primary | ICD-10-CM

## 2025-07-24 DIAGNOSIS — E11.9 TYPE 2 DIABETES MELLITUS WITHOUT COMPLICATION, WITHOUT LONG-TERM CURRENT USE OF INSULIN: ICD-10-CM

## 2025-07-24 DIAGNOSIS — D69.3 IDIOPATHIC THROMBOCYTOPENIC PURPURA (MULTI): ICD-10-CM

## 2025-07-25 DIAGNOSIS — K21.9 GASTROESOPHAGEAL REFLUX DISEASE, UNSPECIFIED WHETHER ESOPHAGITIS PRESENT: ICD-10-CM

## 2025-07-25 DIAGNOSIS — J30.89 ALLERGIC RHINITIS DUE TO OTHER ALLERGIC TRIGGER, UNSPECIFIED SEASONALITY: ICD-10-CM

## 2025-07-26 LAB
ANION GAP SERPL CALCULATED.4IONS-SCNC: 12 MMOL/L (CALC) (ref 7–17)
BUN SERPL-MCNC: 12 MG/DL (ref 7–25)
BUN/CREAT SERPL: ABNORMAL (CALC) (ref 6–22)
CALCIUM SERPL-MCNC: 9.1 MG/DL (ref 8.6–10.2)
CHLORIDE SERPL-SCNC: 108 MMOL/L (ref 98–110)
CO2 SERPL-SCNC: 19 MMOL/L (ref 20–32)
CREAT SERPL-MCNC: 0.72 MG/DL (ref 0.5–0.99)
EGFRCR SERPLBLD CKD-EPI 2021: 103 ML/MIN/1.73M2
ERYTHROCYTE [DISTWIDTH] IN BLOOD BY AUTOMATED COUNT: 13.7 % (ref 11–15)
EST. AVERAGE GLUCOSE BLD GHB EST-MCNC: 108 MG/DL
EST. AVERAGE GLUCOSE BLD GHB EST-SCNC: 6 MMOL/L
GLUCOSE SERPL-MCNC: 100 MG/DL (ref 65–99)
HBA1C MFR BLD: 5.4 %
HCT VFR BLD AUTO: 42.5 % (ref 35–45)
HGB BLD-MCNC: 13.5 G/DL (ref 11.7–15.5)
MCH RBC QN AUTO: 26.8 PG (ref 27–33)
MCHC RBC AUTO-ENTMCNC: 31.8 G/DL (ref 32–36)
MCV RBC AUTO: 84.3 FL (ref 80–100)
PLATELET # BLD AUTO: 283 THOUSAND/UL (ref 140–400)
PMV BLD REES-ECKER: 10.7 FL (ref 7.5–12.5)
POTASSIUM SERPL-SCNC: 4.3 MMOL/L (ref 3.5–5.3)
RBC # BLD AUTO: 5.04 MILLION/UL (ref 3.8–5.1)
SODIUM SERPL-SCNC: 139 MMOL/L (ref 135–146)
WBC # BLD AUTO: 8 THOUSAND/UL (ref 3.8–10.8)

## 2025-07-28 ENCOUNTER — RESULTS FOLLOW-UP (OUTPATIENT)
Dept: PRIMARY CARE | Facility: CLINIC | Age: 50
End: 2025-07-28
Payer: MEDICAID

## 2025-07-28 NOTE — TELEPHONE ENCOUNTER
Results given to patient. Patient verbalized understanding of results.   Patient was inquiring about anemia shown on results.

## 2025-07-28 NOTE — TELEPHONE ENCOUNTER
----- Message from Olivia Sabillon sent at 7/28/2025  1:14 PM EDT -----  Labs are good  ----- Message -----  From: Davion, Bills Khakis Results In  Sent: 7/26/2025  12:14 AM EDT  To: Olivia Sabillon MD

## 2025-07-29 RX ORDER — AZELASTINE 1 MG/ML
SPRAY, METERED NASAL
Qty: 30 ML | Refills: 11 | Status: SHIPPED | OUTPATIENT
Start: 2025-07-29

## 2025-07-29 RX ORDER — OMEPRAZOLE 40 MG/1
40 CAPSULE, DELAYED RELEASE ORAL DAILY
Qty: 30 CAPSULE | Refills: 11 | Status: SHIPPED | OUTPATIENT
Start: 2025-07-29

## 2025-07-31 ENCOUNTER — APPOINTMENT (OUTPATIENT)
Dept: RADIOLOGY | Facility: HOSPITAL | Age: 50
End: 2025-07-31
Payer: MEDICAID

## 2025-08-12 ENCOUNTER — APPOINTMENT (OUTPATIENT)
Dept: PRIMARY CARE | Facility: CLINIC | Age: 50
End: 2025-08-12
Payer: MEDICARE

## 2025-08-12 VITALS
SYSTOLIC BLOOD PRESSURE: 128 MMHG | HEIGHT: 61 IN | BODY MASS INDEX: 52.3 KG/M2 | OXYGEN SATURATION: 96 % | WEIGHT: 277 LBS | HEART RATE: 67 BPM | DIASTOLIC BLOOD PRESSURE: 64 MMHG

## 2025-08-12 DIAGNOSIS — E66.813 CLASS 3 SEVERE OBESITY DUE TO EXCESS CALORIES WITH SERIOUS COMORBIDITY AND BODY MASS INDEX (BMI) OF 50.0 TO 59.9 IN ADULT: ICD-10-CM

## 2025-08-12 DIAGNOSIS — Z12.31 SCREENING MAMMOGRAM FOR BREAST CANCER: ICD-10-CM

## 2025-08-12 DIAGNOSIS — Z12.11 COLON CANCER SCREENING: ICD-10-CM

## 2025-08-12 DIAGNOSIS — E11.42 DIABETIC POLYNEUROPATHY ASSOCIATED WITH TYPE 2 DIABETES MELLITUS: ICD-10-CM

## 2025-08-12 DIAGNOSIS — E78.2 MIXED HYPERLIPIDEMIA: ICD-10-CM

## 2025-08-12 DIAGNOSIS — E55.9 VITAMIN D DEFICIENCY: ICD-10-CM

## 2025-08-12 DIAGNOSIS — G43.909 MIGRAINE WITHOUT STATUS MIGRAINOSUS, NOT INTRACTABLE, UNSPECIFIED MIGRAINE TYPE: ICD-10-CM

## 2025-08-12 DIAGNOSIS — E83.42 HYPOMAGNESEMIA: ICD-10-CM

## 2025-08-12 DIAGNOSIS — E11.9 TYPE 2 DIABETES MELLITUS WITHOUT COMPLICATION, WITHOUT LONG-TERM CURRENT USE OF INSULIN: ICD-10-CM

## 2025-08-12 DIAGNOSIS — R05.9 COUGH, UNSPECIFIED TYPE: ICD-10-CM

## 2025-08-12 DIAGNOSIS — F14.20 COCAINE USE DISORDER, MODERATE: ICD-10-CM

## 2025-08-12 DIAGNOSIS — M79.642 LEFT HAND PAIN: ICD-10-CM

## 2025-08-12 DIAGNOSIS — K21.9 GASTROESOPHAGEAL REFLUX DISEASE, UNSPECIFIED WHETHER ESOPHAGITIS PRESENT: ICD-10-CM

## 2025-08-12 DIAGNOSIS — I10 PRIMARY HYPERTENSION: ICD-10-CM

## 2025-08-12 DIAGNOSIS — D22.5 MELANOCYTIC NEVI OF TRUNK: ICD-10-CM

## 2025-08-12 DIAGNOSIS — J45.20 MILD INTERMITTENT ASTHMA WITHOUT COMPLICATION (HHS-HCC): ICD-10-CM

## 2025-08-12 DIAGNOSIS — K76.0 FATTY LIVER: ICD-10-CM

## 2025-08-12 DIAGNOSIS — G47.33 OBSTRUCTIVE SLEEP APNEA: ICD-10-CM

## 2025-08-12 DIAGNOSIS — I35.0 SEVERE AORTIC STENOSIS BY PRIOR ECHOCARDIOGRAM: Primary | Chronic | ICD-10-CM

## 2025-08-12 PROCEDURE — G2211 COMPLEX E/M VISIT ADD ON: HCPCS | Performed by: PEDIATRICS

## 2025-08-12 PROCEDURE — 3078F DIAST BP <80 MM HG: CPT | Performed by: PEDIATRICS

## 2025-08-12 PROCEDURE — 3008F BODY MASS INDEX DOCD: CPT | Performed by: PEDIATRICS

## 2025-08-12 PROCEDURE — 4010F ACE/ARB THERAPY RXD/TAKEN: CPT | Performed by: PEDIATRICS

## 2025-08-12 PROCEDURE — 3074F SYST BP LT 130 MM HG: CPT | Performed by: PEDIATRICS

## 2025-08-12 PROCEDURE — 99214 OFFICE O/P EST MOD 30 MIN: CPT | Performed by: PEDIATRICS

## 2025-08-12 PROCEDURE — RXMED WILLOW AMBULATORY MEDICATION CHARGE

## 2025-08-12 RX ORDER — VIT C/E/ZN/COPPR/LUTEIN/ZEAXAN 250MG-90MG
25 CAPSULE ORAL DAILY
Qty: 30 CAPSULE | Refills: 11 | Status: SHIPPED | OUTPATIENT
Start: 2025-08-12 | End: 2026-08-12

## 2025-08-12 RX ORDER — BUDESONIDE AND FORMOTEROL FUMARATE DIHYDRATE 80; 4.5 UG/1; UG/1
2 AEROSOL RESPIRATORY (INHALATION) 2 TIMES DAILY PRN
Qty: 10.2 G | Refills: 3 | Status: SHIPPED | OUTPATIENT
Start: 2025-08-12 | End: 2026-08-12

## 2025-08-12 RX ORDER — ATORVASTATIN CALCIUM 20 MG/1
20 TABLET, FILM COATED ORAL DAILY
Qty: 100 TABLET | Refills: 3 | Status: SHIPPED | OUTPATIENT
Start: 2025-08-12 | End: 2026-09-16

## 2025-08-12 RX ORDER — GABAPENTIN 100 MG/1
200 CAPSULE ORAL 3 TIMES DAILY
Qty: 180 CAPSULE | Refills: 5 | Status: SHIPPED | OUTPATIENT
Start: 2025-08-12

## 2025-08-14 ENCOUNTER — RESULTS FOLLOW-UP (OUTPATIENT)
Dept: PRIMARY CARE | Facility: CLINIC | Age: 50
End: 2025-08-14
Payer: MEDICAID

## 2025-08-14 DIAGNOSIS — M25.542 ARTHRALGIA OF LEFT HAND: Primary | ICD-10-CM

## 2025-08-14 DIAGNOSIS — Z12.11 COLON CANCER SCREENING: Primary | ICD-10-CM

## 2025-08-14 LAB
ERYTHROCYTE [SEDIMENTATION RATE] IN BLOOD BY WESTERGREN METHOD: 11 MM/H
MAGNESIUM SERPL-MCNC: 2 MG/DL (ref 1.5–2.5)
URATE SERPL-MCNC: 3.9 MG/DL (ref 2.5–7)

## 2025-08-15 ENCOUNTER — PHARMACY VISIT (OUTPATIENT)
Dept: PHARMACY | Facility: CLINIC | Age: 50
End: 2025-08-15
Payer: COMMERCIAL

## 2025-08-25 DIAGNOSIS — E11.9 TYPE 2 DIABETES MELLITUS WITHOUT COMPLICATION, WITHOUT LONG-TERM CURRENT USE OF INSULIN: ICD-10-CM

## 2025-08-25 DIAGNOSIS — I10 HYPERTENSION, UNSPECIFIED TYPE: ICD-10-CM

## 2025-08-28 RX ORDER — METFORMIN HYDROCHLORIDE 500 MG/1
2000 TABLET, EXTENDED RELEASE ORAL
Qty: 360 TABLET | Refills: 1 | Status: SHIPPED | OUTPATIENT
Start: 2025-08-28

## 2025-08-28 RX ORDER — LOSARTAN POTASSIUM 100 MG/1
100 TABLET ORAL DAILY
Qty: 90 TABLET | Refills: 1 | Status: SHIPPED | OUTPATIENT
Start: 2025-08-28

## 2025-09-04 ENCOUNTER — APPOINTMENT (OUTPATIENT)
Dept: RADIOLOGY | Facility: CLINIC | Age: 50
End: 2025-09-04
Payer: MEDICAID

## 2025-10-02 ENCOUNTER — APPOINTMENT (OUTPATIENT)
Dept: RADIOLOGY | Facility: CLINIC | Age: 50
End: 2025-10-02
Payer: MEDICAID

## 2025-11-12 ENCOUNTER — APPOINTMENT (OUTPATIENT)
Dept: PRIMARY CARE | Facility: CLINIC | Age: 50
End: 2025-11-12
Payer: MEDICARE

## 2025-11-19 ENCOUNTER — APPOINTMENT (OUTPATIENT)
Facility: CLINIC | Age: 50
End: 2025-11-19
Payer: MEDICARE